# Patient Record
Sex: FEMALE | Race: WHITE | Employment: OTHER | URBAN - METROPOLITAN AREA
[De-identification: names, ages, dates, MRNs, and addresses within clinical notes are randomized per-mention and may not be internally consistent; named-entity substitution may affect disease eponyms.]

---

## 2017-03-29 RX ORDER — ATENOLOL 25 MG/1
TABLET ORAL
Qty: 30 TABLET | Refills: 2 | Status: SHIPPED | OUTPATIENT
Start: 2017-03-29 | End: 2017-05-19 | Stop reason: SDUPTHER

## 2017-05-01 RX ORDER — GABAPENTIN 100 MG/1
CAPSULE ORAL
Qty: 30 CAPSULE | Refills: 4 | Status: SHIPPED | OUTPATIENT
Start: 2017-05-01 | End: 2017-05-19 | Stop reason: SDUPTHER

## 2017-05-01 RX ORDER — GLYBURIDE 5 MG/1
TABLET ORAL
Qty: 30 TABLET | Refills: 4 | Status: SHIPPED | OUTPATIENT
Start: 2017-05-01 | End: 2017-05-19 | Stop reason: SDUPTHER

## 2017-05-19 ENCOUNTER — OFFICE VISIT (OUTPATIENT)
Dept: INTERNAL MEDICINE CLINIC | Age: 57
End: 2017-05-19

## 2017-05-19 VITALS
HEIGHT: 62 IN | SYSTOLIC BLOOD PRESSURE: 138 MMHG | WEIGHT: 194 LBS | HEART RATE: 64 BPM | BODY MASS INDEX: 35.7 KG/M2 | DIASTOLIC BLOOD PRESSURE: 84 MMHG

## 2017-05-19 DIAGNOSIS — I10 ESSENTIAL HYPERTENSION: ICD-10-CM

## 2017-05-19 DIAGNOSIS — G63 POLYNEUROPATHY ASSOCIATED WITH UNDERLYING DISEASE (HCC): ICD-10-CM

## 2017-05-19 DIAGNOSIS — E11.9 TYPE 2 DIABETES MELLITUS WITHOUT COMPLICATION, WITHOUT LONG-TERM CURRENT USE OF INSULIN (HCC): ICD-10-CM

## 2017-05-19 DIAGNOSIS — E11.9 TYPE 2 DIABETES MELLITUS WITHOUT COMPLICATION, WITHOUT LONG-TERM CURRENT USE OF INSULIN (HCC): Primary | ICD-10-CM

## 2017-05-19 PROCEDURE — 99212 OFFICE O/P EST SF 10 MIN: CPT | Performed by: INTERNAL MEDICINE

## 2017-05-19 RX ORDER — GABAPENTIN 100 MG/1
CAPSULE ORAL
Qty: 30 CAPSULE | Refills: 5 | Status: SHIPPED | OUTPATIENT
Start: 2017-05-19 | End: 2017-12-01 | Stop reason: SDUPTHER

## 2017-05-19 RX ORDER — ATENOLOL 25 MG/1
TABLET ORAL
Qty: 30 TABLET | Refills: 5 | Status: SHIPPED | OUTPATIENT
Start: 2017-05-19 | End: 2017-12-01 | Stop reason: SDUPTHER

## 2017-05-19 RX ORDER — GLYBURIDE 5 MG/1
TABLET ORAL
Qty: 30 TABLET | Refills: 5 | Status: SHIPPED | OUTPATIENT
Start: 2017-05-19 | End: 2017-12-01 | Stop reason: SDUPTHER

## 2017-05-19 ASSESSMENT — ENCOUNTER SYMPTOMS
BLOOD IN STOOL: 0
NAUSEA: 0
COUGH: 0
DIARRHEA: 0
CHEST TIGHTNESS: 0
SHORTNESS OF BREATH: 0
VOMITING: 0
WHEEZING: 0
ABDOMINAL PAIN: 0

## 2017-05-20 LAB
ANION GAP SERPL CALCULATED.3IONS-SCNC: 19 MMOL/L (ref 3–16)
BUN BLDV-MCNC: 4 MG/DL (ref 7–20)
CALCIUM SERPL-MCNC: 9.7 MG/DL (ref 8.3–10.6)
CHLORIDE BLD-SCNC: 83 MMOL/L (ref 99–110)
CO2: 22 MMOL/L (ref 21–32)
CREAT SERPL-MCNC: 0.5 MG/DL (ref 0.6–1.1)
CREATININE URINE: 20.9 MG/DL (ref 28–259)
ESTIMATED AVERAGE GLUCOSE: 162.8 MG/DL
GFR AFRICAN AMERICAN: >60
GFR NON-AFRICAN AMERICAN: >60
GLUCOSE BLD-MCNC: 249 MG/DL (ref 70–99)
HBA1C MFR BLD: 7.3 %
MICROALBUMIN UR-MCNC: <1.2 MG/DL
MICROALBUMIN/CREAT UR-RTO: ABNORMAL MG/G (ref 0–30)
POTASSIUM SERPL-SCNC: 4.6 MMOL/L (ref 3.5–5.1)
SODIUM BLD-SCNC: 124 MMOL/L (ref 136–145)

## 2017-05-22 DIAGNOSIS — E87.1 HYPONATREMIA: Primary | ICD-10-CM

## 2017-09-08 ENCOUNTER — TELEPHONE (OUTPATIENT)
Dept: INTERNAL MEDICINE CLINIC | Age: 57
End: 2017-09-08

## 2017-12-01 ENCOUNTER — OFFICE VISIT (OUTPATIENT)
Dept: INTERNAL MEDICINE CLINIC | Age: 57
End: 2017-12-01

## 2017-12-01 VITALS
HEIGHT: 62 IN | SYSTOLIC BLOOD PRESSURE: 130 MMHG | BODY MASS INDEX: 35.33 KG/M2 | DIASTOLIC BLOOD PRESSURE: 80 MMHG | HEART RATE: 64 BPM | WEIGHT: 192 LBS

## 2017-12-01 DIAGNOSIS — G63 POLYNEUROPATHY ASSOCIATED WITH UNDERLYING DISEASE (HCC): ICD-10-CM

## 2017-12-01 DIAGNOSIS — E11.9 TYPE 2 DIABETES MELLITUS WITHOUT COMPLICATION, WITHOUT LONG-TERM CURRENT USE OF INSULIN (HCC): Primary | ICD-10-CM

## 2017-12-01 DIAGNOSIS — I10 ESSENTIAL HYPERTENSION: ICD-10-CM

## 2017-12-01 PROCEDURE — 99212 OFFICE O/P EST SF 10 MIN: CPT | Performed by: INTERNAL MEDICINE

## 2017-12-01 RX ORDER — GLYBURIDE 5 MG/1
TABLET ORAL
Qty: 30 TABLET | Refills: 5 | Status: SHIPPED | OUTPATIENT
Start: 2017-12-01 | End: 2018-07-13 | Stop reason: SDUPTHER

## 2017-12-01 RX ORDER — OXYBUTYNIN CHLORIDE 5 MG/1
5 TABLET ORAL 3 TIMES DAILY
Qty: 90 TABLET | Refills: 5 | Status: SHIPPED | OUTPATIENT
Start: 2017-12-01 | End: 2018-07-13 | Stop reason: SDUPTHER

## 2017-12-01 RX ORDER — GABAPENTIN 100 MG/1
CAPSULE ORAL
Qty: 30 CAPSULE | Refills: 5 | Status: SHIPPED | OUTPATIENT
Start: 2017-12-01 | End: 2018-07-13 | Stop reason: SDUPTHER

## 2017-12-01 RX ORDER — ATENOLOL 25 MG/1
TABLET ORAL
Qty: 30 TABLET | Refills: 5 | Status: SHIPPED | OUTPATIENT
Start: 2017-12-01 | End: 2018-07-05 | Stop reason: SDUPTHER

## 2017-12-01 ASSESSMENT — ENCOUNTER SYMPTOMS
CHEST TIGHTNESS: 0
DIARRHEA: 0
VOMITING: 0
ABDOMINAL PAIN: 0
SHORTNESS OF BREATH: 0
WHEEZING: 0
COUGH: 0
BLOOD IN STOOL: 0
NAUSEA: 0

## 2017-12-01 NOTE — PROGRESS NOTES
Subjective:      Patient ID: Swati Newberry is a 61 y.o. female. HPI    She has HTN,DM,diabetic neuropathy. Blood pressure is under good control. Blood sugars are under fair control. Takes gabapentin for neuropathy. Review of Systems   Constitutional: Negative for fatigue, fever and unexpected weight change. Respiratory: Negative for cough, chest tightness, shortness of breath and wheezing. Cardiovascular: Negative for chest pain, palpitations and leg swelling. Gastrointestinal: Negative for abdominal pain, blood in stool, diarrhea, nausea and vomiting. Neurological: Negative for light-headedness. Objective:   Physical Exam   Constitutional: She is oriented to person, place, and time. She appears well-developed and well-nourished. HENT:   Head: Normocephalic and atraumatic. Eyes: Pupils are equal, round, and reactive to light. Neck: Normal range of motion. Neck supple. No thyromegaly present. Cardiovascular: Normal rate, regular rhythm and normal heart sounds. Exam reveals no gallop and no friction rub. No murmur heard. No carotid bruit   Pulmonary/Chest: Effort normal and breath sounds normal. No respiratory distress. She has no wheezes. She has no rales. She exhibits no tenderness. Abdominal: Soft. Bowel sounds are normal. She exhibits no distension and no mass. There is no tenderness. There is no rebound and no guarding. Musculoskeletal: She exhibits no edema. Neurological: She is alert and oriented to person, place, and time. Assessment:      1. Type 2 diabetes mellitus without complication, without long-term current use of insulin ([address]     2. Essential hypertension     3. Polyneuropathy associated with underlying disease ([address]             Plan:        HTN. Stable. Continue current meds for now     DM- stable. Continue current meds.     continue gabapentin. Add ditropan for urinary urge incontinence.     No blood test as she doesn't have insurance

## 2018-07-05 RX ORDER — ATENOLOL 25 MG/1
TABLET ORAL
Qty: 30 TABLET | Refills: 4 | Status: SHIPPED | OUTPATIENT
Start: 2018-07-05 | End: 2018-07-13 | Stop reason: SDUPTHER

## 2018-07-13 ENCOUNTER — OFFICE VISIT (OUTPATIENT)
Dept: INTERNAL MEDICINE CLINIC | Age: 58
End: 2018-07-13

## 2018-07-13 VITALS
BODY MASS INDEX: 36.25 KG/M2 | DIASTOLIC BLOOD PRESSURE: 80 MMHG | SYSTOLIC BLOOD PRESSURE: 138 MMHG | HEIGHT: 62 IN | WEIGHT: 197 LBS | HEART RATE: 72 BPM

## 2018-07-13 DIAGNOSIS — I10 ESSENTIAL HYPERTENSION: ICD-10-CM

## 2018-07-13 DIAGNOSIS — E11.9 TYPE 2 DIABETES MELLITUS WITHOUT COMPLICATION, WITHOUT LONG-TERM CURRENT USE OF INSULIN (HCC): ICD-10-CM

## 2018-07-13 DIAGNOSIS — N39.41 URINARY INCONTINENCE, URGE: ICD-10-CM

## 2018-07-13 DIAGNOSIS — G63 POLYNEUROPATHY ASSOCIATED WITH UNDERLYING DISEASE (HCC): ICD-10-CM

## 2018-07-13 DIAGNOSIS — E11.9 TYPE 2 DIABETES MELLITUS WITHOUT COMPLICATION, WITHOUT LONG-TERM CURRENT USE OF INSULIN (HCC): Primary | ICD-10-CM

## 2018-07-13 LAB
A/G RATIO: 1.3 (ref 1.1–2.2)
ALBUMIN SERPL-MCNC: 4.2 G/DL (ref 3.4–5)
ALP BLD-CCNC: 75 U/L (ref 40–129)
ALT SERPL-CCNC: 16 U/L (ref 10–40)
ANION GAP SERPL CALCULATED.3IONS-SCNC: 15 MMOL/L (ref 3–16)
AST SERPL-CCNC: 25 U/L (ref 15–37)
BASOPHILS ABSOLUTE: 0 K/UL (ref 0–0.2)
BASOPHILS RELATIVE PERCENT: 0.6 %
BILIRUB SERPL-MCNC: 0.7 MG/DL (ref 0–1)
BUN BLDV-MCNC: 6 MG/DL (ref 7–20)
CALCIUM SERPL-MCNC: 10 MG/DL (ref 8.3–10.6)
CHLORIDE BLD-SCNC: 95 MMOL/L (ref 99–110)
CHOLESTEROL, TOTAL: 165 MG/DL (ref 0–199)
CO2: 24 MMOL/L (ref 21–32)
CREAT SERPL-MCNC: 0.6 MG/DL (ref 0.6–1.1)
EOSINOPHILS ABSOLUTE: 0.1 K/UL (ref 0–0.6)
EOSINOPHILS RELATIVE PERCENT: 1 %
GFR AFRICAN AMERICAN: >60
GFR NON-AFRICAN AMERICAN: >60
GLOBULIN: 3.2 G/DL
GLUCOSE BLD-MCNC: 94 MG/DL (ref 70–99)
HCT VFR BLD CALC: 41.7 % (ref 36–48)
HDLC SERPL-MCNC: 72 MG/DL (ref 40–60)
HEMOGLOBIN: 14.5 G/DL (ref 12–16)
LDL CHOLESTEROL CALCULATED: 73 MG/DL
LYMPHOCYTES ABSOLUTE: 1.4 K/UL (ref 1–5.1)
LYMPHOCYTES RELATIVE PERCENT: 19.9 %
MCH RBC QN AUTO: 33.8 PG (ref 26–34)
MCHC RBC AUTO-ENTMCNC: 34.9 G/DL (ref 31–36)
MCV RBC AUTO: 97 FL (ref 80–100)
MONOCYTES ABSOLUTE: 0.5 K/UL (ref 0–1.3)
MONOCYTES RELATIVE PERCENT: 7.7 %
NEUTROPHILS ABSOLUTE: 5.1 K/UL (ref 1.7–7.7)
NEUTROPHILS RELATIVE PERCENT: 70.8 %
PDW BLD-RTO: 13.5 % (ref 12.4–15.4)
PLATELET # BLD: 138 K/UL (ref 135–450)
PMV BLD AUTO: 9.4 FL (ref 5–10.5)
POTASSIUM SERPL-SCNC: 4.6 MMOL/L (ref 3.5–5.1)
RBC # BLD: 4.3 M/UL (ref 4–5.2)
SODIUM BLD-SCNC: 134 MMOL/L (ref 136–145)
TOTAL PROTEIN: 7.4 G/DL (ref 6.4–8.2)
TRIGL SERPL-MCNC: 98 MG/DL (ref 0–150)
VLDLC SERPL CALC-MCNC: 20 MG/DL
WBC # BLD: 7.1 K/UL (ref 4–11)

## 2018-07-13 PROCEDURE — 99212 OFFICE O/P EST SF 10 MIN: CPT | Performed by: INTERNAL MEDICINE

## 2018-07-13 RX ORDER — GABAPENTIN 100 MG/1
CAPSULE ORAL
Qty: 30 CAPSULE | Refills: 5 | Status: SHIPPED | OUTPATIENT
Start: 2018-07-13 | End: 2019-01-25 | Stop reason: SDUPTHER

## 2018-07-13 RX ORDER — OXYBUTYNIN CHLORIDE 5 MG/1
5 TABLET ORAL 3 TIMES DAILY
Qty: 90 TABLET | Refills: 5 | Status: SHIPPED | OUTPATIENT
Start: 2018-07-13 | End: 2019-01-25 | Stop reason: SDUPTHER

## 2018-07-13 RX ORDER — ATENOLOL 25 MG/1
TABLET ORAL
Qty: 30 TABLET | Refills: 4 | Status: SHIPPED | OUTPATIENT
Start: 2018-07-13 | End: 2019-01-25 | Stop reason: SDUPTHER

## 2018-07-13 RX ORDER — GLYBURIDE 5 MG/1
TABLET ORAL
Qty: 30 TABLET | Refills: 5 | Status: SHIPPED | OUTPATIENT
Start: 2018-07-13 | End: 2019-02-28 | Stop reason: SDUPTHER

## 2018-07-13 ASSESSMENT — ENCOUNTER SYMPTOMS
NAUSEA: 0
COUGH: 0
ABDOMINAL PAIN: 0
CHEST TIGHTNESS: 0
SHORTNESS OF BREATH: 0
VOMITING: 0
WHEEZING: 0
DIARRHEA: 0
BLOOD IN STOOL: 0

## 2018-07-13 NOTE — PROGRESS NOTES
HTN.   Stable. Continue current meds for now     DM- stable.   Continue current meds.     continue gabapentin.     Continue ditropan for urinary urge incontinence.

## 2018-07-14 LAB
ESTIMATED AVERAGE GLUCOSE: 139.9 MG/DL
HBA1C MFR BLD: 6.5 %

## 2019-01-25 ENCOUNTER — OFFICE VISIT (OUTPATIENT)
Dept: INTERNAL MEDICINE CLINIC | Age: 59
End: 2019-01-25

## 2019-01-25 VITALS
WEIGHT: 203 LBS | HEIGHT: 62 IN | HEART RATE: 70 BPM | BODY MASS INDEX: 37.36 KG/M2 | DIASTOLIC BLOOD PRESSURE: 75 MMHG | SYSTOLIC BLOOD PRESSURE: 152 MMHG | RESPIRATION RATE: 18 BRPM

## 2019-01-25 DIAGNOSIS — N39.41 URINARY INCONTINENCE, URGE: ICD-10-CM

## 2019-01-25 DIAGNOSIS — E11.9 TYPE 2 DIABETES MELLITUS WITHOUT COMPLICATION, WITHOUT LONG-TERM CURRENT USE OF INSULIN (HCC): Primary | ICD-10-CM

## 2019-01-25 DIAGNOSIS — G63 POLYNEUROPATHY ASSOCIATED WITH UNDERLYING DISEASE (HCC): ICD-10-CM

## 2019-01-25 DIAGNOSIS — I10 ESSENTIAL HYPERTENSION: ICD-10-CM

## 2019-01-25 PROCEDURE — 99212 OFFICE O/P EST SF 10 MIN: CPT | Performed by: INTERNAL MEDICINE

## 2019-01-25 RX ORDER — AMLODIPINE BESYLATE 5 MG/1
5 TABLET ORAL DAILY
Qty: 30 TABLET | Refills: 5 | Status: SHIPPED | OUTPATIENT
Start: 2019-01-25 | End: 2019-07-05 | Stop reason: SDUPTHER

## 2019-01-25 RX ORDER — RANITIDINE 150 MG/1
150 TABLET ORAL 2 TIMES DAILY
Qty: 60 TABLET | Refills: 3 | Status: SHIPPED | OUTPATIENT
Start: 2019-01-25 | End: 2019-05-29 | Stop reason: SDUPTHER

## 2019-01-25 RX ORDER — GABAPENTIN 100 MG/1
100 CAPSULE ORAL 2 TIMES DAILY
Qty: 60 CAPSULE | Refills: 5 | Status: SHIPPED | OUTPATIENT
Start: 2019-01-25 | End: 2019-12-05 | Stop reason: SDUPTHER

## 2019-01-25 RX ORDER — OXYBUTYNIN CHLORIDE 5 MG/1
5 TABLET ORAL 3 TIMES DAILY
Qty: 90 TABLET | Refills: 5 | Status: SHIPPED | OUTPATIENT
Start: 2019-01-25 | End: 2019-07-05 | Stop reason: SDUPTHER

## 2019-01-25 RX ORDER — ATENOLOL 25 MG/1
TABLET ORAL
Qty: 30 TABLET | Refills: 5 | Status: SHIPPED | OUTPATIENT
Start: 2019-01-25 | End: 2019-07-05 | Stop reason: SDUPTHER

## 2019-01-28 ENCOUNTER — TELEPHONE (OUTPATIENT)
Dept: INTERNAL MEDICINE CLINIC | Age: 59
End: 2019-01-28

## 2019-02-28 RX ORDER — GLYBURIDE 5 MG/1
TABLET ORAL
Qty: 30 TABLET | Refills: 2 | Status: SHIPPED | OUTPATIENT
Start: 2019-02-28 | End: 2019-05-29 | Stop reason: SDUPTHER

## 2019-05-29 RX ORDER — GLYBURIDE 5 MG/1
TABLET ORAL
Qty: 30 TABLET | Refills: 2 | Status: SHIPPED | OUTPATIENT
Start: 2019-05-29 | End: 2019-06-28 | Stop reason: SDUPTHER

## 2019-05-29 RX ORDER — RANITIDINE 150 MG/1
TABLET ORAL
Qty: 60 TABLET | Refills: 2 | Status: SHIPPED | OUTPATIENT
Start: 2019-05-29 | End: 2019-06-28 | Stop reason: SDUPTHER

## 2019-06-28 RX ORDER — RANITIDINE 150 MG/1
TABLET ORAL
Qty: 60 TABLET | Refills: 2 | Status: SHIPPED | OUTPATIENT
Start: 2019-06-28 | End: 2019-12-05

## 2019-06-28 RX ORDER — GLYBURIDE 5 MG/1
TABLET ORAL
Qty: 30 TABLET | Refills: 2 | Status: SHIPPED | OUTPATIENT
Start: 2019-06-28 | End: 2019-10-25 | Stop reason: SDUPTHER

## 2019-07-05 ENCOUNTER — OFFICE VISIT (OUTPATIENT)
Dept: INTERNAL MEDICINE CLINIC | Age: 59
End: 2019-07-05

## 2019-07-05 ENCOUNTER — TELEPHONE (OUTPATIENT)
Dept: INTERNAL MEDICINE CLINIC | Age: 59
End: 2019-07-05

## 2019-07-05 VITALS
DIASTOLIC BLOOD PRESSURE: 84 MMHG | HEIGHT: 62 IN | BODY MASS INDEX: 35.15 KG/M2 | SYSTOLIC BLOOD PRESSURE: 138 MMHG | HEART RATE: 72 BPM | WEIGHT: 191 LBS

## 2019-07-05 DIAGNOSIS — G63 POLYNEUROPATHY ASSOCIATED WITH UNDERLYING DISEASE (HCC): ICD-10-CM

## 2019-07-05 DIAGNOSIS — I10 ESSENTIAL HYPERTENSION: ICD-10-CM

## 2019-07-05 DIAGNOSIS — F32.A DEPRESSION, UNSPECIFIED DEPRESSION TYPE: ICD-10-CM

## 2019-07-05 DIAGNOSIS — N39.41 URINARY INCONTINENCE, URGE: ICD-10-CM

## 2019-07-05 DIAGNOSIS — E11.9 TYPE 2 DIABETES MELLITUS WITHOUT COMPLICATION, WITHOUT LONG-TERM CURRENT USE OF INSULIN (HCC): Primary | ICD-10-CM

## 2019-07-05 DIAGNOSIS — E11.9 TYPE 2 DIABETES MELLITUS WITHOUT COMPLICATION, WITHOUT LONG-TERM CURRENT USE OF INSULIN (HCC): ICD-10-CM

## 2019-07-05 LAB
ANION GAP SERPL CALCULATED.3IONS-SCNC: 14 MMOL/L (ref 3–16)
BUN BLDV-MCNC: 3 MG/DL (ref 7–20)
CALCIUM SERPL-MCNC: 10 MG/DL (ref 8.3–10.6)
CHLORIDE BLD-SCNC: 101 MMOL/L (ref 99–110)
CO2: 20 MMOL/L (ref 21–32)
CREAT SERPL-MCNC: <0.5 MG/DL (ref 0.6–1.2)
CREATININE URINE: 33.5 MG/DL (ref 28–259)
GFR AFRICAN AMERICAN: >60
GFR NON-AFRICAN AMERICAN: >60
GLUCOSE BLD-MCNC: 85 MG/DL (ref 70–99)
MICROALBUMIN UR-MCNC: <1.2 MG/DL
MICROALBUMIN/CREAT UR-RTO: NORMAL MG/G (ref 0–30)
POTASSIUM SERPL-SCNC: 4.5 MMOL/L (ref 3.5–5.1)
SODIUM BLD-SCNC: 135 MMOL/L (ref 136–145)

## 2019-07-05 PROCEDURE — 99212 OFFICE O/P EST SF 10 MIN: CPT | Performed by: INTERNAL MEDICINE

## 2019-07-05 RX ORDER — OXYBUTYNIN CHLORIDE 5 MG/1
5 TABLET ORAL 3 TIMES DAILY
Qty: 90 TABLET | Refills: 5 | Status: SHIPPED | OUTPATIENT
Start: 2019-07-05 | End: 2020-03-13 | Stop reason: SDUPTHER

## 2019-07-05 RX ORDER — ESCITALOPRAM OXALATE 10 MG/1
10 TABLET ORAL DAILY
Qty: 30 TABLET | Refills: 5 | Status: SHIPPED | OUTPATIENT
Start: 2019-07-05 | End: 2020-03-13 | Stop reason: SDUPTHER

## 2019-07-05 RX ORDER — ATENOLOL 25 MG/1
TABLET ORAL
Qty: 30 TABLET | Refills: 5 | Status: SHIPPED | OUTPATIENT
Start: 2019-07-05 | End: 2020-03-13 | Stop reason: SDUPTHER

## 2019-07-05 RX ORDER — AMLODIPINE BESYLATE 5 MG/1
5 TABLET ORAL DAILY
Qty: 30 TABLET | Refills: 5 | Status: SHIPPED | OUTPATIENT
Start: 2019-07-05 | End: 2020-03-13 | Stop reason: SDUPTHER

## 2019-07-05 ASSESSMENT — ENCOUNTER SYMPTOMS
NAUSEA: 0
WHEEZING: 0
SHORTNESS OF BREATH: 0
COUGH: 0
CHEST TIGHTNESS: 0
ABDOMINAL PAIN: 0
VOMITING: 0
DIARRHEA: 0
BLOOD IN STOOL: 0

## 2019-07-05 NOTE — PROGRESS NOTES
Subjective:      Patient ID: Melanie Nelson is a 60 y.o. female. HPI  She has HTN,DM,diabetic neuropathy. Blood pressure is under good control. Blood sugars are under fair control. Takes gabapentin for neuropathy. Feels stressed and depressed. No suicidal ideation    Review of Systems   Constitutional: Negative for fatigue, fever and unexpected weight change. Respiratory: Negative for cough, chest tightness, shortness of breath and wheezing. Cardiovascular: Negative for chest pain, palpitations and leg swelling. Gastrointestinal: Negative for abdominal pain, blood in stool, diarrhea, nausea and vomiting. Neurological: Negative for light-headedness. Objective:   Physical Exam   Constitutional: She is oriented to person, place, and time. She appears well-developed and well-nourished. HENT:   Head: Normocephalic and atraumatic. Eyes: Pupils are equal, round, and reactive to light. Neck: Normal range of motion. Neck supple. No thyromegaly present. Cardiovascular: Normal rate, regular rhythm and normal heart sounds. Exam reveals no gallop and no friction rub. No murmur heard. No carotid bruit   Pulmonary/Chest: Effort normal and breath sounds normal. No respiratory distress. She has no wheezes. She has no rales. She exhibits no tenderness. Abdominal: Soft. Bowel sounds are normal. She exhibits no distension and no mass. There is no tenderness. There is no rebound and no guarding. Musculoskeletal: She exhibits no edema. Neurological: She is alert and oriented to person, place, and time. Assessment:       Diagnosis Orders   1. Type 2 diabetes mellitus without complication, without long-term current use of insulin ([facility])  Basic Metabolic Panel    Hemoglobin A1C     DIABETES FOOT EXAM    Microalbumin / Creatinine Urine Ratio   2. Essential hypertension     3. Polyneuropathy associated with underlying disease ([facility] [address]     4. Urinary incontinence, urge     5.  Depression, unspecified

## 2019-07-06 LAB
ESTIMATED AVERAGE GLUCOSE: 139.9 MG/DL
HBA1C MFR BLD: 6.5 %

## 2019-10-25 RX ORDER — GLYBURIDE 5 MG/1
TABLET ORAL
Qty: 30 TABLET | Refills: 5 | Status: SHIPPED | OUTPATIENT
Start: 2019-10-25 | End: 2020-03-13 | Stop reason: SDUPTHER

## 2019-12-05 RX ORDER — GABAPENTIN 100 MG/1
CAPSULE ORAL
Qty: 60 CAPSULE | Refills: 0 | Status: SHIPPED | OUTPATIENT
Start: 2019-12-05 | End: 2020-03-13 | Stop reason: SDUPTHER

## 2019-12-05 RX ORDER — RANITIDINE 150 MG/1
TABLET ORAL
Qty: 60 TABLET | Refills: 0 | Status: SHIPPED | OUTPATIENT
Start: 2019-12-05 | End: 2020-03-13 | Stop reason: SDUPTHER

## 2020-03-13 ENCOUNTER — OFFICE VISIT (OUTPATIENT)
Dept: INTERNAL MEDICINE CLINIC | Age: 60
End: 2020-03-13

## 2020-03-13 VITALS
HEART RATE: 68 BPM | HEIGHT: 62 IN | DIASTOLIC BLOOD PRESSURE: 76 MMHG | SYSTOLIC BLOOD PRESSURE: 122 MMHG | BODY MASS INDEX: 34.93 KG/M2

## 2020-03-13 PROCEDURE — 99212 OFFICE O/P EST SF 10 MIN: CPT | Performed by: INTERNAL MEDICINE

## 2020-03-13 RX ORDER — AMLODIPINE BESYLATE 5 MG/1
5 TABLET ORAL DAILY
Qty: 30 TABLET | Refills: 5 | Status: SHIPPED | OUTPATIENT
Start: 2020-03-13 | End: 2020-09-22 | Stop reason: SDUPTHER

## 2020-03-13 RX ORDER — GLYBURIDE 5 MG/1
TABLET ORAL
Qty: 30 TABLET | Refills: 5 | Status: SHIPPED | OUTPATIENT
Start: 2020-03-13 | End: 2020-09-22 | Stop reason: SDUPTHER

## 2020-03-13 RX ORDER — ATENOLOL 25 MG/1
TABLET ORAL
Qty: 30 TABLET | Refills: 5 | Status: SHIPPED | OUTPATIENT
Start: 2020-03-13 | End: 2020-09-22 | Stop reason: SDUPTHER

## 2020-03-13 RX ORDER — RANITIDINE 150 MG/1
TABLET ORAL
Qty: 60 TABLET | Refills: 5 | Status: SHIPPED | OUTPATIENT
Start: 2020-03-13 | End: 2021-03-31

## 2020-03-13 RX ORDER — OXYBUTYNIN CHLORIDE 5 MG/1
5 TABLET ORAL 3 TIMES DAILY
Qty: 90 TABLET | Refills: 5 | Status: SHIPPED | OUTPATIENT
Start: 2020-03-13 | End: 2020-09-22 | Stop reason: SDUPTHER

## 2020-03-13 RX ORDER — ESCITALOPRAM OXALATE 10 MG/1
10 TABLET ORAL DAILY
Qty: 30 TABLET | Refills: 5 | Status: SHIPPED | OUTPATIENT
Start: 2020-03-13 | End: 2020-09-22 | Stop reason: SDUPTHER

## 2020-03-13 ASSESSMENT — ENCOUNTER SYMPTOMS
CHEST TIGHTNESS: 0
VOMITING: 0
NAUSEA: 0
SHORTNESS OF BREATH: 0
ABDOMINAL PAIN: 0
BLOOD IN STOOL: 0
COUGH: 0
DIARRHEA: 0
WHEEZING: 0

## 2020-03-13 NOTE — PROGRESS NOTES
Subjective:      Patient ID: Liz Carranza is a 60 y.o. female. HPI  She has HTN,DM,diabetic neuropathy. Blood pressure is under good control. Blood sugars are under fair control. Takes gabapentin for neuropathy.     Ricardo Castillo is working well for depression    Had a distal left femoral fracture 2/20 s/p internal fixation  Now has a knee immobilizer     She does not have insurance    Review of Systems   Constitutional: Negative for fatigue, fever and unexpected weight change. Respiratory: Negative for cough, chest tightness, shortness of breath and wheezing. Cardiovascular: Negative for chest pain, palpitations and leg swelling. Gastrointestinal: Negative for abdominal pain, blood in stool, diarrhea, nausea and vomiting. Genitourinary: Negative for dysuria and hematuria. Neurological: Negative for light-headedness. Objective:   Physical Exam  Constitutional:       Appearance: She is well-developed. HENT:      Head: Normocephalic and atraumatic. Eyes:      Pupils: Pupils are equal, round, and reactive to light. Neck:      Musculoskeletal: Normal range of motion and neck supple. Thyroid: No thyromegaly. Cardiovascular:      Rate and Rhythm: Normal rate and regular rhythm. Heart sounds: Normal heart sounds. No murmur. No friction rub. No gallop. Comments: No carotid bruit  Pulmonary:      Effort: Pulmonary effort is normal. No respiratory distress. Breath sounds: Normal breath sounds. No wheezing or rales. Chest:      Chest wall: No tenderness. Abdominal:      General: Bowel sounds are normal. There is no distension. Palpations: Abdomen is soft. There is no mass. Tenderness: There is no abdominal tenderness. There is no guarding or rebound. Neurological:      Mental Status: She is alert and oriented to person, place, and time. Assessment:       Diagnosis Orders   1.  Type 2 diabetes mellitus without complication, without long-term current use of

## 2020-03-15 RX ORDER — GABAPENTIN 100 MG/1
CAPSULE ORAL
Qty: 60 CAPSULE | Refills: 0 | Status: SHIPPED | OUTPATIENT
Start: 2020-03-15 | End: 2020-04-17

## 2020-04-18 RX ORDER — GABAPENTIN 100 MG/1
CAPSULE ORAL
Qty: 60 CAPSULE | Refills: 0 | Status: SHIPPED | OUTPATIENT
Start: 2020-04-18 | End: 2020-04-20

## 2020-04-20 RX ORDER — GABAPENTIN 100 MG/1
CAPSULE ORAL
Qty: 60 CAPSULE | Refills: 0 | Status: SHIPPED | OUTPATIENT
Start: 2020-04-20 | End: 2020-05-18

## 2020-05-18 RX ORDER — GABAPENTIN 100 MG/1
CAPSULE ORAL
Qty: 60 CAPSULE | Refills: 0 | Status: SHIPPED | OUTPATIENT
Start: 2020-05-21 | End: 2020-06-10

## 2020-06-10 RX ORDER — GABAPENTIN 100 MG/1
CAPSULE ORAL
Qty: 60 CAPSULE | Refills: 0 | Status: SHIPPED | OUTPATIENT
Start: 2020-06-10 | End: 2020-08-28

## 2020-08-28 RX ORDER — GABAPENTIN 100 MG/1
CAPSULE ORAL
Qty: 60 CAPSULE | Refills: 0 | Status: SHIPPED | OUTPATIENT
Start: 2020-08-28 | End: 2020-09-24

## 2020-09-22 ENCOUNTER — OFFICE VISIT (OUTPATIENT)
Dept: INTERNAL MEDICINE CLINIC | Age: 60
End: 2020-09-22

## 2020-09-22 VITALS
BODY MASS INDEX: 37.17 KG/M2 | DIASTOLIC BLOOD PRESSURE: 82 MMHG | HEIGHT: 62 IN | HEART RATE: 60 BPM | SYSTOLIC BLOOD PRESSURE: 130 MMHG | WEIGHT: 202 LBS | TEMPERATURE: 97.7 F

## 2020-09-22 DIAGNOSIS — E11.9 TYPE 2 DIABETES MELLITUS WITHOUT COMPLICATION, WITHOUT LONG-TERM CURRENT USE OF INSULIN (HCC): ICD-10-CM

## 2020-09-22 DIAGNOSIS — I10 ESSENTIAL HYPERTENSION: ICD-10-CM

## 2020-09-22 LAB
A/G RATIO: 1.3 (ref 1.1–2.2)
ALBUMIN SERPL-MCNC: 4.2 G/DL (ref 3.4–5)
ALP BLD-CCNC: 97 U/L (ref 40–129)
ALT SERPL-CCNC: 18 U/L (ref 10–40)
ANION GAP SERPL CALCULATED.3IONS-SCNC: 10 MMOL/L (ref 3–16)
AST SERPL-CCNC: 33 U/L (ref 15–37)
BASOPHILS ABSOLUTE: 0 K/UL (ref 0–0.2)
BASOPHILS RELATIVE PERCENT: 0.9 %
BILIRUB SERPL-MCNC: 0.7 MG/DL (ref 0–1)
BUN BLDV-MCNC: 3 MG/DL (ref 7–20)
CALCIUM SERPL-MCNC: 10.1 MG/DL (ref 8.3–10.6)
CHLORIDE BLD-SCNC: 96 MMOL/L (ref 99–110)
CHOLESTEROL, TOTAL: 175 MG/DL (ref 0–199)
CO2: 27 MMOL/L (ref 21–32)
CREAT SERPL-MCNC: <0.5 MG/DL (ref 0.6–1.2)
CREATININE URINE: 17.5 MG/DL (ref 28–259)
EOSINOPHILS ABSOLUTE: 0.1 K/UL (ref 0–0.6)
EOSINOPHILS RELATIVE PERCENT: 2.5 %
GFR AFRICAN AMERICAN: >60
GFR NON-AFRICAN AMERICAN: >60
GLOBULIN: 3.2 G/DL
GLUCOSE BLD-MCNC: 136 MG/DL (ref 70–99)
HCT VFR BLD CALC: 40 % (ref 36–48)
HDLC SERPL-MCNC: 84 MG/DL (ref 40–60)
HEMOGLOBIN: 13.6 G/DL (ref 12–16)
LDL CHOLESTEROL CALCULATED: 75 MG/DL
LYMPHOCYTES ABSOLUTE: 1 K/UL (ref 1–5.1)
LYMPHOCYTES RELATIVE PERCENT: 29.3 %
MCH RBC QN AUTO: 30.9 PG (ref 26–34)
MCHC RBC AUTO-ENTMCNC: 33.9 G/DL (ref 31–36)
MCV RBC AUTO: 91 FL (ref 80–100)
MICROALBUMIN UR-MCNC: <1.2 MG/DL
MICROALBUMIN/CREAT UR-RTO: ABNORMAL MG/G (ref 0–30)
MONOCYTES ABSOLUTE: 0.4 K/UL (ref 0–1.3)
MONOCYTES RELATIVE PERCENT: 10.8 %
NEUTROPHILS ABSOLUTE: 1.9 K/UL (ref 1.7–7.7)
NEUTROPHILS RELATIVE PERCENT: 56.5 %
PDW BLD-RTO: 15.2 % (ref 12.4–15.4)
PLATELET # BLD: 128 K/UL (ref 135–450)
PMV BLD AUTO: 8.8 FL (ref 5–10.5)
POTASSIUM SERPL-SCNC: 4.6 MMOL/L (ref 3.5–5.1)
RBC # BLD: 4.4 M/UL (ref 4–5.2)
SODIUM BLD-SCNC: 133 MMOL/L (ref 136–145)
TOTAL PROTEIN: 7.4 G/DL (ref 6.4–8.2)
TRIGL SERPL-MCNC: 80 MG/DL (ref 0–150)
VLDLC SERPL CALC-MCNC: 16 MG/DL
WBC # BLD: 3.4 K/UL (ref 4–11)

## 2020-09-22 PROCEDURE — 99212 OFFICE O/P EST SF 10 MIN: CPT | Performed by: INTERNAL MEDICINE

## 2020-09-22 RX ORDER — RANITIDINE 150 MG/1
TABLET ORAL
Qty: 60 TABLET | Refills: 5 | Status: CANCELLED | OUTPATIENT
Start: 2020-09-22

## 2020-09-22 RX ORDER — ESCITALOPRAM OXALATE 10 MG/1
10 TABLET ORAL DAILY
Qty: 30 TABLET | Refills: 5 | Status: SHIPPED | OUTPATIENT
Start: 2020-09-22 | End: 2021-03-31 | Stop reason: SDUPTHER

## 2020-09-22 RX ORDER — GLYBURIDE 5 MG/1
TABLET ORAL
Qty: 30 TABLET | Refills: 5 | Status: SHIPPED | OUTPATIENT
Start: 2020-09-22 | End: 2021-03-31 | Stop reason: SDUPTHER

## 2020-09-22 RX ORDER — AMLODIPINE BESYLATE 5 MG/1
5 TABLET ORAL DAILY
Qty: 30 TABLET | Refills: 5 | Status: SHIPPED | OUTPATIENT
Start: 2020-09-22 | End: 2021-03-31 | Stop reason: SDUPTHER

## 2020-09-22 RX ORDER — ATENOLOL 25 MG/1
TABLET ORAL
Qty: 30 TABLET | Refills: 5 | Status: SHIPPED | OUTPATIENT
Start: 2020-09-22 | End: 2021-03-31 | Stop reason: SDUPTHER

## 2020-09-22 RX ORDER — FAMOTIDINE 20 MG/1
20 TABLET, FILM COATED ORAL 2 TIMES DAILY
Qty: 60 TABLET | Refills: 5 | Status: ON HOLD
Start: 2020-09-22 | End: 2021-03-27 | Stop reason: HOSPADM

## 2020-09-22 RX ORDER — BETAMETHASONE DIPROPIONATE 0.5 MG/G
CREAM TOPICAL
Qty: 60 G | Refills: 2 | Status: SHIPPED | OUTPATIENT
Start: 2020-09-22 | End: 2020-10-22

## 2020-09-22 RX ORDER — OXYBUTYNIN CHLORIDE 5 MG/1
5 TABLET ORAL 3 TIMES DAILY
Qty: 90 TABLET | Refills: 5 | Status: SHIPPED | OUTPATIENT
Start: 2020-09-22 | End: 2021-03-31 | Stop reason: SDUPTHER

## 2020-09-22 ASSESSMENT — ENCOUNTER SYMPTOMS
CHEST TIGHTNESS: 0
ABDOMINAL PAIN: 0
BLOOD IN STOOL: 0
WHEEZING: 0
DIARRHEA: 0
SHORTNESS OF BREATH: 0
VOMITING: 0
COUGH: 0
NAUSEA: 0

## 2020-09-22 NOTE — PROGRESS NOTES
Subjective:      Patient ID: Jared Tate is a 63 y.o. female. HPI  She has HTN,DM,diabetic neuropathy. Blood pressure is under good control. Blood sugars are under fair control. Takes gabapentin for neuropathy.     Katarzyna Mckay is working well for depression     Had a distal left femoral fracture 2/20 s/p internal fixation    Her psoriasis is flaring up      Review of Systems   Constitutional: Negative for fatigue, fever and unexpected weight change. Respiratory: Negative for cough, chest tightness, shortness of breath and wheezing. Cardiovascular: Negative for chest pain, palpitations and leg swelling. Gastrointestinal: Negative for abdominal pain, blood in stool, diarrhea, nausea and vomiting. Genitourinary: Negative for dysuria and hematuria. Neurological: Negative for light-headedness. Objective:   Physical Exam  Constitutional:       Appearance: She is well-developed. HENT:      Head: Normocephalic and atraumatic. Eyes:      Pupils: Pupils are equal, round, and reactive to light. Neck:      Musculoskeletal: Normal range of motion and neck supple. Thyroid: No thyromegaly. Cardiovascular:      Rate and Rhythm: Normal rate and regular rhythm. Heart sounds: Normal heart sounds. No murmur. No friction rub. No gallop. Comments: No carotid bruit  Pulmonary:      Effort: Pulmonary effort is normal. No respiratory distress. Breath sounds: Normal breath sounds. No wheezing or rales. Chest:      Chest wall: No tenderness. Abdominal:      General: Bowel sounds are normal. There is no distension. Palpations: Abdomen is soft. There is no mass. Tenderness: There is no abdominal tenderness. There is no guarding or rebound. Neurological:      Mental Status: She is alert and oriented to person, place, and time. Assessment:       Diagnosis Orders   1.  Type 2 diabetes mellitus without complication, without long-term current use of insulin (HCC)  Hemoglobin A1C Comprehensive Metabolic Panel    CBC Auto Differential    Lipid Panel    Microalbumin / Creatinine Urine Ratio     DIABETES FOOT EXAM   2. Essential hypertension  Comprehensive Metabolic Panel   3. Polyneuropathy associated with underlying disease ([facility] [address]     4. Depression, unspecified depression type     5. Urinary incontinence, urge             Plan:        HTN. Stable. Continue current meds      DM- stable. Continue current meds.     Peripheral neuropathy  continue gabapentin.     Depression  Stable.   Continue lexapro    psoriasis   Betamethasone cream     Continue ditropan for urinary urge incontinence.     Schedule mammogram   Hemoccult x3 done 7/19- negative        Nagi Brannon MD

## 2020-09-23 LAB
ESTIMATED AVERAGE GLUCOSE: 128.4 MG/DL
HBA1C MFR BLD: 6.1 %

## 2020-09-24 RX ORDER — GABAPENTIN 100 MG/1
CAPSULE ORAL
Qty: 60 CAPSULE | Refills: 0 | Status: SHIPPED | OUTPATIENT
Start: 2020-09-29 | End: 2020-11-02

## 2020-10-01 ENCOUNTER — TELEPHONE (OUTPATIENT)
Dept: INTERNAL MEDICINE CLINIC | Age: 60
End: 2020-10-01

## 2020-10-01 RX ORDER — BLOOD-GLUCOSE METER
KIT MISCELLANEOUS
Qty: 1 KIT | Refills: 0 | Status: SHIPPED | OUTPATIENT
Start: 2020-10-01

## 2020-10-01 NOTE — TELEPHONE ENCOUNTER
----- Message from Beulah Mcdonough MD sent at 9/23/2020  9:13 AM EDT -----  Blood tests are good except for her blood counts are borderline low    Repeat CBC with diff in 10 days   Dx LEukopenia

## 2020-11-02 RX ORDER — GABAPENTIN 100 MG/1
CAPSULE ORAL
Qty: 60 CAPSULE | Refills: 0 | Status: SHIPPED | OUTPATIENT
Start: 2020-11-02 | End: 2020-12-02

## 2020-12-02 RX ORDER — GABAPENTIN 100 MG/1
CAPSULE ORAL
Qty: 60 CAPSULE | Refills: 0 | Status: SHIPPED | OUTPATIENT
Start: 2020-12-04 | End: 2020-12-30

## 2020-12-30 RX ORDER — GABAPENTIN 100 MG/1
CAPSULE ORAL
Qty: 60 CAPSULE | Refills: 0 | Status: SHIPPED | OUTPATIENT
Start: 2021-01-04 | End: 2021-02-02

## 2021-02-02 DIAGNOSIS — G63 POLYNEUROPATHY ASSOCIATED WITH UNDERLYING DISEASE (HCC): ICD-10-CM

## 2021-02-02 RX ORDER — GABAPENTIN 100 MG/1
CAPSULE ORAL
Qty: 60 CAPSULE | Refills: 0 | Status: SHIPPED | OUTPATIENT
Start: 2021-02-03 | End: 2021-03-03

## 2021-03-03 DIAGNOSIS — G63 POLYNEUROPATHY ASSOCIATED WITH UNDERLYING DISEASE (HCC): ICD-10-CM

## 2021-03-03 RX ORDER — GABAPENTIN 100 MG/1
CAPSULE ORAL
Qty: 60 CAPSULE | Refills: 0 | Status: SHIPPED | OUTPATIENT
Start: 2021-03-05 | End: 2021-04-02

## 2021-03-21 ENCOUNTER — APPOINTMENT (OUTPATIENT)
Dept: GENERAL RADIOLOGY | Age: 61
DRG: 308 | End: 2021-03-21
Attending: HOSPITALIST
Payer: MEDICAID

## 2021-03-21 ENCOUNTER — HOSPITAL ENCOUNTER (EMERGENCY)
Age: 61
Discharge: ANOTHER HOSPITAL | End: 2021-03-21
Attending: STUDENT IN AN ORGANIZED HEALTH CARE EDUCATION/TRAINING PROGRAM
Payer: MEDICAID

## 2021-03-21 ENCOUNTER — APPOINTMENT (OUTPATIENT)
Dept: GENERAL RADIOLOGY | Age: 61
End: 2021-03-21
Payer: MEDICAID

## 2021-03-21 ENCOUNTER — HOSPITAL ENCOUNTER (INPATIENT)
Age: 61
LOS: 6 days | Discharge: HOME HEALTH | DRG: 308 | End: 2021-03-27
Attending: HOSPITALIST | Admitting: HOSPITALIST
Payer: MEDICAID

## 2021-03-21 VITALS
HEIGHT: 62 IN | OXYGEN SATURATION: 96 % | RESPIRATION RATE: 17 BRPM | BODY MASS INDEX: 36.99 KG/M2 | SYSTOLIC BLOOD PRESSURE: 115 MMHG | WEIGHT: 201 LBS | DIASTOLIC BLOOD PRESSURE: 69 MMHG | TEMPERATURE: 97.5 F | HEART RATE: 64 BPM

## 2021-03-21 DIAGNOSIS — S72.142A CLOSED DISPLACED INTERTROCHANTERIC FRACTURE OF LEFT FEMUR, INITIAL ENCOUNTER (HCC): Primary | ICD-10-CM

## 2021-03-21 DIAGNOSIS — S92.302A CLOSED DISPLACED FRACTURE OF METATARSAL BONE OF LEFT FOOT, UNSPECIFIED METATARSAL, INITIAL ENCOUNTER: ICD-10-CM

## 2021-03-21 DIAGNOSIS — E87.1 HYPONATREMIA: ICD-10-CM
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
## 2021-03-21 DIAGNOSIS — S52.502A CLOSED FRACTURE OF DISTAL END OF LEFT RADIUS, UNSPECIFIED FRACTURE MORPHOLOGY, INITIAL ENCOUNTER: ICD-10-CM

## 2021-03-21 DIAGNOSIS — S00.12XA BLACK EYE OF LEFT SIDE, INITIAL ENCOUNTER: ICD-10-CM

## 2021-03-21 DIAGNOSIS — S72.145A CLOSED NONDISPLACED INTERTROCHANTERIC FRACTURE OF LEFT FEMUR, INITIAL ENCOUNTER (HCC): Primary | ICD-10-CM

## 2021-03-21 PROBLEM — R63.1 PSYCHOGENIC POLYDIPSIA: Status: ACTIVE | Noted: 2021-03-21

## 2021-03-21 PROBLEM — S72.002A CLOSED FRACTURE OF NECK OF LEFT FEMUR (HCC): Status: ACTIVE | Noted: 2021-03-21

## 2021-03-21 PROBLEM — R73.9 ACUTE HYPERGLYCEMIA: Status: ACTIVE | Noted: 2021-03-21

## 2021-03-21 PROBLEM — D69.6 THROMBOCYTOPENIA (HCC): Status: ACTIVE | Noted: 2021-03-21

## 2021-03-21 PROBLEM — F54 PSYCHOGENIC POLYDIPSIA: Status: ACTIVE | Noted: 2021-03-21

## 2021-03-21 PROBLEM — R55 SYNCOPE AND COLLAPSE: Status: ACTIVE | Noted: 2021-03-21

## 2021-03-21 LAB
A/G RATIO: 1.2 (ref 1.1–2.2)
ALBUMIN SERPL-MCNC: 4.3 G/DL (ref 3.4–5)
ALP BLD-CCNC: 92 U/L (ref 40–129)
ALT SERPL-CCNC: 16 U/L (ref 10–40)
ANION GAP SERPL CALCULATED.3IONS-SCNC: 10 MMOL/L (ref 3–16)
AST SERPL-CCNC: 29 U/L (ref 15–37)
BASOPHILS ABSOLUTE: 0.1 K/UL (ref 0–0.2)
BASOPHILS RELATIVE PERCENT: 1 %
BILIRUB SERPL-MCNC: 1.5 MG/DL (ref 0–1)
BUN BLDV-MCNC: 6 MG/DL (ref 7–20)
CALCIUM SERPL-MCNC: 10.2 MG/DL (ref 8.3–10.6)
CHLORIDE BLD-SCNC: 87 MMOL/L (ref 99–110)
CO2: 28 MMOL/L (ref 21–32)
CREAT SERPL-MCNC: 0.6 MG/DL (ref 0.6–1.2)
EOSINOPHILS ABSOLUTE: 0 K/UL (ref 0–0.6)
EOSINOPHILS RELATIVE PERCENT: 0 %
GFR AFRICAN AMERICAN: >60
GFR NON-AFRICAN AMERICAN: >60
GLOBULIN: 3.5 G/DL
GLUCOSE BLD-MCNC: 179 MG/DL (ref 70–99)
GLUCOSE BLD-MCNC: 246 MG/DL (ref 70–99)
HCT VFR BLD CALC: 40.1 % (ref 36–48)
HEMOGLOBIN: 13.8 G/DL (ref 12–16)
INR BLD: 1.23 (ref 0.86–1.14)
LYMPHOCYTES ABSOLUTE: 1 K/UL (ref 1–5.1)
LYMPHOCYTES RELATIVE PERCENT: 16 %
MCH RBC QN AUTO: 33.1 PG (ref 26–34)
MCHC RBC AUTO-ENTMCNC: 34.3 G/DL (ref 31–36)
MCV RBC AUTO: 96.6 FL (ref 80–100)
MONOCYTES ABSOLUTE: 0.1 K/UL (ref 0–1.3)
MONOCYTES RELATIVE PERCENT: 2 %
NEUTROPHILS ABSOLUTE: 4.9 K/UL (ref 1.7–7.7)
NEUTROPHILS RELATIVE PERCENT: 81 %
PDW BLD-RTO: 14 % (ref 12.4–15.4)
PERFORMED ON: ABNORMAL
PLATELET # BLD: 106 K/UL (ref 135–450)
PLATELET SLIDE REVIEW: ADEQUATE
PMV BLD AUTO: 8.4 FL (ref 5–10.5)
POTASSIUM REFLEX MAGNESIUM: 3.9 MMOL/L (ref 3.5–5.1)
PROTHROMBIN TIME: 14.3 SEC (ref 10–13.2)
RBC # BLD: 4.16 M/UL (ref 4–5.2)
SLIDE REVIEW: ABNORMAL
SODIUM BLD-SCNC: 125 MMOL/L (ref 136–145)
TOTAL PROTEIN: 7.8 G/DL (ref 6.4–8.2)
WBC # BLD: 6.1 K/UL (ref 4–11)

## 2021-03-21 PROCEDURE — 80053 COMPREHEN METABOLIC PANEL: CPT

## 2021-03-21 PROCEDURE — 1200000000 HC SEMI PRIVATE

## 2021-03-21 PROCEDURE — 6360000002 HC RX W HCPCS: Performed by: NURSE PRACTITIONER

## 2021-03-21 PROCEDURE — 73110 X-RAY EXAM OF WRIST: CPT

## 2021-03-21 PROCEDURE — 96374 THER/PROPH/DIAG INJ IV PUSH: CPT

## 2021-03-21 PROCEDURE — 96375 TX/PRO/DX INJ NEW DRUG ADDON: CPT

## 2021-03-21 PROCEDURE — 73630 X-RAY EXAM OF FOOT: CPT

## 2021-03-21 PROCEDURE — 73552 X-RAY EXAM OF FEMUR 2/>: CPT

## 2021-03-21 PROCEDURE — 73502 X-RAY EXAM HIP UNI 2-3 VIEWS: CPT

## 2021-03-21 PROCEDURE — 99283 EMERGENCY DEPT VISIT LOW MDM: CPT | Performed by: ORTHOPAEDIC SURGERY

## 2021-03-21 PROCEDURE — 85610 PROTHROMBIN TIME: CPT

## 2021-03-21 PROCEDURE — 99283 EMERGENCY DEPT VISIT LOW MDM: CPT

## 2021-03-21 PROCEDURE — 6360000002 HC RX W HCPCS: Performed by: INTERNAL MEDICINE

## 2021-03-21 PROCEDURE — 71045 X-RAY EXAM CHEST 1 VIEW: CPT

## 2021-03-21 PROCEDURE — 6370000000 HC RX 637 (ALT 250 FOR IP): Performed by: NURSE PRACTITIONER

## 2021-03-21 PROCEDURE — 85025 COMPLETE CBC W/AUTO DIFF WBC: CPT

## 2021-03-21 RX ORDER — GABAPENTIN 100 MG/1
100 CAPSULE ORAL 2 TIMES DAILY
Status: DISCONTINUED | OUTPATIENT
Start: 2021-03-21 | End: 2021-03-27 | Stop reason: HOSPADM

## 2021-03-21 RX ORDER — DEXTROSE MONOHYDRATE 25 G/50ML
12.5 INJECTION, SOLUTION INTRAVENOUS PRN
Status: DISCONTINUED | OUTPATIENT
Start: 2021-03-21 | End: 2021-03-27 | Stop reason: HOSPADM

## 2021-03-21 RX ORDER — SODIUM CHLORIDE 0.9 % (FLUSH) 0.9 %
10 SYRINGE (ML) INJECTION PRN
Status: DISCONTINUED | OUTPATIENT
Start: 2021-03-21 | End: 2021-03-27 | Stop reason: HOSPADM

## 2021-03-21 RX ORDER — ESCITALOPRAM OXALATE 10 MG/1
10 TABLET ORAL DAILY
Status: DISCONTINUED | OUTPATIENT
Start: 2021-03-22 | End: 2021-03-27 | Stop reason: HOSPADM

## 2021-03-21 RX ORDER — POTASSIUM CHLORIDE 20 MEQ/1
40 TABLET, EXTENDED RELEASE ORAL PRN
Status: DISCONTINUED | OUTPATIENT
Start: 2021-03-21 | End: 2021-03-27 | Stop reason: HOSPADM

## 2021-03-21 RX ORDER — SODIUM CHLORIDE 9 MG/ML
INJECTION, SOLUTION INTRAVENOUS CONTINUOUS
Status: ACTIVE | OUTPATIENT
Start: 2021-03-21 | End: 2021-03-22

## 2021-03-21 RX ORDER — SODIUM CHLORIDE 0.9 % (FLUSH) 0.9 %
10 SYRINGE (ML) INJECTION EVERY 12 HOURS SCHEDULED
Status: DISCONTINUED | OUTPATIENT
Start: 2021-03-21 | End: 2021-03-27 | Stop reason: HOSPADM

## 2021-03-21 RX ORDER — DEXTROSE MONOHYDRATE 50 MG/ML
100 INJECTION, SOLUTION INTRAVENOUS PRN
Status: DISCONTINUED | OUTPATIENT
Start: 2021-03-21 | End: 2021-03-27 | Stop reason: HOSPADM

## 2021-03-21 RX ORDER — MORPHINE SULFATE 4 MG/ML
4 INJECTION, SOLUTION INTRAMUSCULAR; INTRAVENOUS ONCE
Status: COMPLETED | OUTPATIENT
Start: 2021-03-21 | End: 2021-03-21

## 2021-03-21 RX ORDER — POTASSIUM CHLORIDE 7.45 MG/ML
10 INJECTION INTRAVENOUS PRN
Status: DISCONTINUED | OUTPATIENT
Start: 2021-03-21 | End: 2021-03-27 | Stop reason: HOSPADM

## 2021-03-21 RX ORDER — INSULIN GLARGINE 100 [IU]/ML
14 INJECTION, SOLUTION SUBCUTANEOUS NIGHTLY
Status: DISCONTINUED | OUTPATIENT
Start: 2021-03-21 | End: 2021-03-23

## 2021-03-21 RX ORDER — MAGNESIUM SULFATE IN WATER 40 MG/ML
2000 INJECTION, SOLUTION INTRAVENOUS PRN
Status: DISCONTINUED | OUTPATIENT
Start: 2021-03-21 | End: 2021-03-27 | Stop reason: HOSPADM

## 2021-03-21 RX ORDER — ONDANSETRON 4 MG/1
4 TABLET, ORALLY DISINTEGRATING ORAL EVERY 8 HOURS PRN
Status: DISCONTINUED | OUTPATIENT
Start: 2021-03-21 | End: 2021-03-27 | Stop reason: HOSPADM

## 2021-03-21 RX ORDER — NICOTINE POLACRILEX 4 MG
15 LOZENGE BUCCAL PRN
Status: DISCONTINUED | OUTPATIENT
Start: 2021-03-21 | End: 2021-03-27 | Stop reason: HOSPADM

## 2021-03-21 RX ORDER — OXYCODONE HYDROCHLORIDE 5 MG/1
10 TABLET ORAL EVERY 4 HOURS PRN
Status: COMPLETED | OUTPATIENT
Start: 2021-03-21 | End: 2021-03-22

## 2021-03-21 RX ORDER — AMLODIPINE BESYLATE 5 MG/1
5 TABLET ORAL DAILY
Status: DISCONTINUED | OUTPATIENT
Start: 2021-03-22 | End: 2021-03-27 | Stop reason: HOSPADM

## 2021-03-21 RX ORDER — ONDANSETRON 2 MG/ML
4 INJECTION INTRAMUSCULAR; INTRAVENOUS EVERY 6 HOURS PRN
Status: DISCONTINUED | OUTPATIENT
Start: 2021-03-21 | End: 2021-03-27 | Stop reason: HOSPADM

## 2021-03-21 RX ORDER — OXYCODONE HYDROCHLORIDE 5 MG/1
5 TABLET ORAL EVERY 4 HOURS PRN
Status: COMPLETED | OUTPATIENT
Start: 2021-03-21 | End: 2021-03-23

## 2021-03-21 RX ORDER — ATENOLOL 25 MG/1
25 TABLET ORAL DAILY
Status: DISCONTINUED | OUTPATIENT
Start: 2021-03-22 | End: 2021-03-27 | Stop reason: HOSPADM

## 2021-03-21 RX ORDER — ONDANSETRON 2 MG/ML
4 INJECTION INTRAMUSCULAR; INTRAVENOUS ONCE
Status: COMPLETED | OUTPATIENT
Start: 2021-03-21 | End: 2021-03-21

## 2021-03-21 RX ORDER — ACETAMINOPHEN 325 MG/1
650 TABLET ORAL EVERY 6 HOURS PRN
Status: DISCONTINUED | OUTPATIENT
Start: 2021-03-21 | End: 2021-03-27 | Stop reason: HOSPADM

## 2021-03-21 RX ORDER — SENNA PLUS 8.6 MG/1
1 TABLET ORAL DAILY PRN
Status: DISCONTINUED | OUTPATIENT
Start: 2021-03-21 | End: 2021-03-27 | Stop reason: HOSPADM

## 2021-03-21 RX ORDER — FAMOTIDINE 20 MG/1
20 TABLET, FILM COATED ORAL 2 TIMES DAILY
Status: DISCONTINUED | OUTPATIENT
Start: 2021-03-21 | End: 2021-03-27 | Stop reason: HOSPADM

## 2021-03-21 RX ORDER — ACETAMINOPHEN 650 MG/1
650 SUPPOSITORY RECTAL EVERY 6 HOURS PRN
Status: DISCONTINUED | OUTPATIENT
Start: 2021-03-21 | End: 2021-03-27 | Stop reason: HOSPADM

## 2021-03-21 RX ORDER — HYDROCODONE BITARTRATE AND ACETAMINOPHEN 5; 325 MG/1; MG/1
1 TABLET ORAL ONCE
Status: COMPLETED | OUTPATIENT
Start: 2021-03-21 | End: 2021-03-21

## 2021-03-21 RX ADMIN — HYDROMORPHONE HYDROCHLORIDE 0.5 MG: 1 INJECTION, SOLUTION INTRAMUSCULAR; INTRAVENOUS; SUBCUTANEOUS at 21:24

## 2021-03-21 RX ADMIN — ONDANSETRON HYDROCHLORIDE 4 MG: 2 INJECTION, SOLUTION INTRAMUSCULAR; INTRAVENOUS at 15:31

## 2021-03-21 RX ADMIN — MORPHINE SULFATE 4 MG: 4 INJECTION, SOLUTION INTRAMUSCULAR; INTRAVENOUS at 15:31

## 2021-03-21 RX ADMIN — HYDROCODONE BITARTRATE AND ACETAMINOPHEN 1 TABLET: 5; 325 TABLET ORAL at 13:44

## 2021-03-21 ASSESSMENT — ENCOUNTER SYMPTOMS
COLOR CHANGE: 0
RHINORRHEA: 0
ABDOMINAL PAIN: 0
SHORTNESS OF BREATH: 0
SORE THROAT: 0

## 2021-03-21 ASSESSMENT — PAIN DESCRIPTION - ORIENTATION: ORIENTATION: LEFT

## 2021-03-21 ASSESSMENT — PAIN SCALES - GENERAL
PAINLEVEL_OUTOF10: 7
PAINLEVEL_OUTOF10: 7
PAINLEVEL_OUTOF10: 10
PAINLEVEL_OUTOF10: 7
PAINLEVEL_OUTOF10: 10

## 2021-03-21 ASSESSMENT — PAIN DESCRIPTION - PAIN TYPE
TYPE: ACUTE PAIN
TYPE: ACUTE PAIN

## 2021-03-21 ASSESSMENT — PAIN SCALES - WONG BAKER: WONGBAKER_NUMERICALRESPONSE: 4

## 2021-03-21 NOTE — ED PROVIDER NOTES
I independently performed a history and physical on Belenda Hammans. All diagnostic, treatment, and disposition decisions were made by myself in conjunction with the advanced practice provider. For further details of CELESTINE SANDHU[facility]CORINNA RAMOS OF [facility] emergency department encounter, please see the advance practice provider's documentation. In brief, this is a 57y.o. year old female, with   Chief Complaint   Patient presents with    Fall     pt sustained fall Friday evening, seen at [facility], Sat am felt \"loud pop\" in L leg hardware (post-sx for femur rfx 1 year ago), cannot walk since     Patient apparently fell 2 days ago and was initially seen at [facility].  She apparently had CT imaging along with x-rays. She was found to have a broken wrist.  Patient apparently went home and felt a loud popping sensation to the right hip area. Since then the patient has been unable to bear weight. On examination the patient has bruising about her left eye. Sclera and conjunctive are normal.  Neck is supple. Chest is clear to auscultation bilaterally regular rate rhythm. Abdomen is soft, overweight, nontender, nondistended, normal bowel sounds. Patient has significant pain with movement of the right lower extremity. Normal sensation distally. Unfortunately patient was found to have an intertrochanteric hip fracture. Orthopedics has requested transfer to [facility] as our facility currently does not have an operative bed capable of handling hip fractures secondary to malfunction with the existing one. Patient is agreeable with this plan. Patient was also found to be hyponatremic. She is splinted from the previously identified fractures.          Jahaira Jackson MD  03/22/21 1154

## 2021-03-21 NOTE — ED PROVIDER NOTES
[address] ED  EMERGENCY DEPARTMENT ENCOUNTER        Pt Name: Belenda Hammans  MRN: [id number]  [place] 1960  Dateof evaluation: 3/21/2021  Provider: DANIEL Menard - CNP  PCP: Carlena Snellen, MD  ED Attending: No att. providers found    [address]       Chief Complaint   Patient presents with    Fall     pt sustained fall Friday evening, seen at [facility], Sat am felt \"loud pop\" in L leg hardware (post-sx for femur rfx 1 year ago), cannot walk since       HISTORY OF PRESENTILLNESS   (Location/Symptom, Timing/Onset, Context/Setting, Quality, Duration, Modifying Factors, Severity)  Note limiting factors. Belenda Hammans is a 57 y.o. female for left hip pain. Onset was 2 days. Context includes patient states that she fell 3 days ago and was seen at [facility].  She reports that she had a complete work-up of lots of x-rays and CAT scans done. She was diagnosed with a left wrist fracture. Patient reports that she has been having left hip pain since the fall. Patient reports that she was able to ambulate however yesterday got up and felt a pop in her left hip and has had increased pain since then. Patient states that she is unable to bear weight now. Patient denies any new recent injury. Alleviating factors include nothing. Aggravating factors include movement ambulation unable to bear weight. Pain is 10/10. Ibuprofen has been used for pain today. Nursing Notes were all reviewed and agreed with or any disagreements were addressed  in the HPI. REVIEW OF SYSTEMS    (2-9 systems for level 4, 10 or more for level 5)     Review of Systems   Constitutional: Negative for fever. HENT: Negative for congestion, rhinorrhea and sore throat. Respiratory: Negative for shortness of breath. Cardiovascular: Negative for chest pain. Gastrointestinal: Negative for abdominal pain.    Genitourinary: Negative for decreased urine volume and difficulty urinating. Musculoskeletal: Negative for arthralgias and myalgias. Left hip pain   Skin: Negative for color change and rash. Neurological: Negative for dizziness and light-headedness. Psychiatric/Behavioral: Negative for agitation. All other systems reviewed and are negative. Positives and Pertinent negatives as per HPI. Except as noted above in the ROS, all other systems were reviewed and negative. PAST MEDICAL HISTORY     Past Medical History:   Diagnosis Date    Hypertension     Type II or unspecified type diabetes mellitus without mention of complication, not stated as uncontrolled          SURGICAL HISTORY     History reviewed. No pertinent surgical history. CURRENT MEDICATIONS       Discharge Medication List as of 3/21/2021  4:37 PM      CONTINUE these medications which have NOT CHANGED    Details   gabapentin (NEURONTIN) 100 MG capsule TAKE ONE CAPSULE BY MOUTH TWICE A DAY, Disp-60 capsule, R-0Normal      blood glucose test strips (ASCENSIA AUTODISC VI;ONE TOUCH ULTRA TEST VI) strip Disp-100 each, R-3, NormalUSE TO TEST DAILY. DX;E11.9      Blood Glucose Monitoring Suppl (Therma FliteR BLOOD GLUCOSE KIT) KIT Disp-1 kit, R-0, NormalUSE TO TEST DAILY.   DX:E11.9      oxybutynin (DITROPAN) 5 MG tablet Take 1 tablet by mouth 3 times daily, Disp-90 tablet, R-5Normal      escitalopram (LEXAPRO) 10 MG tablet Take 1 tablet by mouth daily, Disp-30 tablet, R-5Normal      metFORMIN (GLUCOPHAGE) 1000 MG tablet TAKE ONE TABLET BY MOUTH TWICE A DAY, Disp-60 tablet, R-5Normal      glyBURIDE (DIABETA) 5 MG tablet TAKE ONE TABLET BY MOUTH DAILY, Disp-30 tablet, R-5Normal      amLODIPine (NORVASC) 5 MG tablet Take 1 tablet by mouth daily, Disp-30 tablet, R-5Normal      atenolol (TENORMIN) 25 MG tablet TAKE ONE TABLET BY MOUTH DAILY, Disp-30 tablet, R-5Normal      famotidine (PEPCID) 20 MG tablet Take 1 tablet by mouth 2 times daily, Disp-60 tablet, R-5Normal      raNITIdine (ZANTAC) 150 MG tablet TAKE ONE TABLET BY MOUTH TWICE A DAY, Disp-60 tablet, R-5Normal               ALLERGIES     Ace inhibitors    FAMILY HISTORY     History reviewed. No pertinent family history. SOCIAL HISTORY       Social History     Socioeconomic History    Marital status:      Spouse name: None    Number of children: None    Years of education: None    Highest education level: None   Occupational History    None   Social Needs    Financial resource strain: None    Food insecurity     Worry: None     Inability: None    Transportation needs     Medical: None     Non-medical: None   Tobacco Use    Smoking status: Never Smoker    Smokeless tobacco: Never Used   Substance and Sexual Activity    Alcohol use: No    Drug use: None    Sexual activity: None   Lifestyle    Physical activity     Days per week: None     Minutes per session: None    Stress: None   Relationships    Social connections     Talks on phone: None     Gets together: None     Attends Synagogue service: None     Active member of club or organization: None     Attends meetings of clubs or organizations: None     Relationship status: None    Intimate partner violence     Fear of current or ex partner: None     Emotionally abused: None     Physically abused: None     Forced sexual activity: None   Other Topics Concern    None   Social History Narrative    None       SCREENINGS             PHYSICAL EXAM  (up to 7 for level 4, 8 or more for level 5)     ED Triage Vitals [03/21/21 1319]   BP Temp Temp Source Pulse Resp SpO2 Height Weight   (!) 159/87 97.5 °F (36.4 °C) Oral 64 16 99 % 5' 2\" (1.575 m) 201 lb (91.2 kg)       Physical Exam  Constitutional:       Appearance: She is well-developed. HENT:      Head: Normocephalic and atraumatic. Neck:      Musculoskeletal: Normal range of motion. Cardiovascular:      Rate and Rhythm: Normal rate. Pulmonary:      Effort: Pulmonary effort is normal. No respiratory distress.    Abdominal: General: There is no distension. Palpations: Abdomen is soft. Tenderness: There is no abdominal tenderness. Musculoskeletal:         General: Tenderness and signs of injury present. No swelling or deformity. Comments: Decreased range of motion to left hip due to pain elicited with movement. Tenderness with palpation the left hip. Sensation and pulse intact left foot. No tenderness with palpation to the lumbar and thoracic spinal and paraspinal processes. Patient denies radiculopathy or caudal anesthesia. Skin:     General: Skin is warm and dry. Neurological:      Mental Status: She is alert and oriented to person, place, and time. DIAGNOSTIC RESULTS   LABS:    Labs Reviewed   CBC WITH AUTO DIFFERENTIAL - Abnormal; Notable for the following components:       Result Value    Platelets 592 (*)     All other components within normal limits    Narrative:     Performed at:  [facility] [address],  [place], [facility]   Phone [phone]   COMPREHENSIVE METABOLIC PANEL W/ REFLEX TO MG FOR LOW K - Abnormal; Notable for the following components:    Sodium 125 (*)     Chloride 87 (*)     Glucose 179 (*)     BUN 6 (*)     Total Bilirubin 1.5 (*)     All other components within normal limits    Narrative:     Performed at:  [facility]) - [facility] [address],  [place], [facility]   Phone [phone] - Abnormal; Notable for the following components:    Protime 14.3 (*)     INR 1.23 (*)     All other components within normal limits    Narrative:     Performed at:  [facility]) - [facility] [address],  [place], [facility]   Phone [phone]       All other labs werewithin normal range or not returned as of this dictation. EKG:  All EKG's are interpreted by the Emergency Department Physician who either signs or Co-signs this chart in the absence of a cardiologist.  Please see their note for interpretation of EKG. RADIOLOGY:   Left wrist x-ray interpreted by radiologist for    FINDINGS:   Evaluation is somewhat limited by overlying casting material.  Comminuted   fracture of the distal radius with intra-articular extension.  Palmar   angulation of fracture fragments.  Soft tissue swelling.  Mild cystic change   throughout the carpus.  Mild-to-moderate degenerative changes of the 1st [facility]   joint. Left foot x-ray interpreted by radiologist for  Impression:    Suspected acute or subacute nondisplaced fractures of the 2nd through 4th   metatarsal heads and necks as well as the head of the 2nd proximal phalanx. Left hip x-ray interpreted by radiologist for  Impression:    Mildly displaced intertrochanteric fracture of the left hip. No acute osseous abnormality of the remainder of the left femur.  Previous   ORIF of distal femoral injury. Left femur x-ray interpreted by radiologist for  Impression:    Mildly displaced intertrochanteric fracture of the left hip. No acute osseous abnormality of the remainder of the left femur.  Previous   ORIF of distal femoral injury. Interpretation per the Radiologist below, if available at the time of this note:    XR WRIST LEFT (MIN 3 VIEWS)   Final Result   Distal radial fracture. XR FOOT LEFT (MIN 3 VIEWS)   Final Result   Suspected acute or subacute nondisplaced fractures of the 2nd through 4th   metatarsal heads and necks as well as the head of the 2nd proximal phalanx. XR HIP LEFT (2-3 VIEWS)   Final Result   Mildly displaced intertrochanteric fracture of the left hip. No acute osseous abnormality of the remainder of the left femur. Previous   ORIF of distal femoral injury. XR FEMUR LEFT (MIN 2 VIEWS)   Final Result   Mildly displaced intertrochanteric fracture of the left hip. No acute osseous abnormality of the remainder of the left femur.   Previous   ORIF of distal femoral injury. No results found. PROCEDURES   Unless otherwise noted below, none     Procedures     CRITICAL CARE TIME   N/A    CONSULTS:  IP CONSULT TO HOSPITALIST  IP CONSULT TO ORTHOPEDIC SURGERY      EMERGENCYDEPARTMENT COURSE and DIFFERENTIAL DIAGNOSIS/MDM:   Vitals:    Vitals:    03/21/21 1511 03/21/21 1515 03/21/21 1551 03/21/21 1607   BP:  127/77  115/69   Pulse: 63 65 64 64   Resp: 14 20 16 17   Temp:       TempSrc:       SpO2: 98% 97% 97% 96%   Weight:       Height:           Patient was given the following medications:  Medications   HYDROcodone-acetaminophen (NORCO) 5-325 MG per tablet 1 tablet (1 tablet Oral Given 3/21/21 1344)   morphine sulfate (PF) injection 4 mg (4 mg Intravenous Given 3/21/21 1531)   ondansetron (ZOFRAN) injection 4 mg (4 mg Intravenous Given 3/21/21 1531)       Patient was seen and evaluated by myself and Dr. Arielle Quesada. Patient here for concerns for left hip pain. Patient states that she fell 2 days ago. She reports that she was seen at an outside facility and diagnosed with a left wrist fracture. Patient reports that she had a head CT and additional x-rays done that revealed no other injuries. Patient states that she was discharged home 2 days ago and yesterday got up and felt a pop in her left hip. Patient states that she has not been able to bear weight since then and she has been having pain. On exam patient is awake and alert she is hemodynamically stable. She does have a left black eye. She does have a splint in place to her left forearm. Patient is neurologically intact her left foot however she is unable to bear weight on her left leg due to pain in her left hip. X-rays of her left hip were obtained and were concerning for a intratrochanteric left hip fracture. Orthopedic physician was consulted and was actually in the ED and evaluated the patient in the ED. He added additional x-rays including a forearm and foot.   Foot x-ray revealed metatarsal fractures as well. Patient already is splinted for her left wrist fracture. Consult was placed to the hospitalist at [facility] for transfer due to lack of an orthopedic operating room table at [facility]. Hospitalist is accepted. Patient's care was transferred to [facility] at this time. Patient was provided with pain medications in the ED. Basic labs were completed for preparation for preop and patient was found to be hyponatremic at 125. Patient does report that she has a history of hyponatremia. Patient reports that she likes to drink lots of water and in past has had to have her water restricted due to her sodium. The patient tolerated their visit well. I have evaluated this patient. My supervising physician was available for consultation. The patient and / or the family were informed of the results of any tests, a time was given to answer questions, a plan was proposed and they agreed with plan. FINAL IMPRESSION      1. Closed nondisplaced intertrochanteric fracture of left femur, initial encounter ([facility] [address]    2. Closed displaced fracture of metatarsal bone of left foot, unspecified metatarsal, initial encounter    3. Closed fracture of distal end of left radius, unspecified fracture morphology, initial encounter    4. Fall, subsequent encounter    5. Black eye of left side, initial encounter    6. Hyponatremia          DISPOSITION/PLAN   DISPOSITION Decision To Transfer 03/21/2021 04:37:44 PM      PATIENT REFERRED TO:  No follow-up provider specified.     DISCHARGE MEDICATIONS:  Discharge Medication List as of 3/21/2021  4:37 PM          DISCONTINUED MEDICATIONS:  Discharge Medication List as of 3/21/2021  4:37 PM                 (Please note that portions of this note were completed with a voice recognition program.  Efforts were made to edit the dictations but occasionally words are mis-transcribed.)    DANIEL Colon Cera - CNP (electronically signed)         Sergio Saez Emiliano Serrano - [facility]  03/21/21 [address] - [facility]  03/22/21 1013

## 2021-03-21 NOTE — ED NOTES
1415- Call placed to transfer center for [facility]     Call was returned by Dr. Jensen Morning and spoke to Michela Mckenna  03/21/21 3073

## 2021-03-21 NOTE — PROGRESS NOTES
[facility] Orthopedic Surgery  Progress Note              Chase Lott 57 y.o. admitted for a fall with left hip and wrist fraxure. She fell on Friday, seen in [facility], xray left wrist and femur and not the hip. Today she felt a pop and sharp pain left hip in her bathroom. IMAGING: X-rays were taken 3/21/2021, 3 views of the leftan hip and AP pelvis also 3 views left wrist, was reviewed and showed intertrochanteric displaced fracture with old femur plate, and also displaced left distal radius fracture. IMPRESSION: Left femur intertrochanteric fracture and left distal radius fracture. PLAN:   I discussed the overall alignment of the 2 fractures with the patient over the phone, and treatment options including both surgical and non-surgical treatment, and that my recommendation would be an open reduction and internal fixation of both fractures with Gamma nail left femur and ORIF left distal radius given the amount of displacement of the fracture. I discussed the risks and benefits of surgery with the patient, including but not limited to infection, bleeding, pain, injury to nerves or blood vessels failure of the surgery and need for additional surgery. All the patient's questions were answered. We discussed an expected post-operative course. She is understanding of this and wishes to proceed. Surgery tomorrow afternoon at [facility] if medically stable.        Arabella Germain MD 3/21/2021 4:19 PM

## 2021-03-21 NOTE — ED NOTES
Bed: T2  Expected date:   Expected time:   Means of arrival:   Comments:     Sendy Pereira RN  03/21/21 9634

## 2021-03-21 NOTE — PROGRESS NOTES
4 Eyes Skin Assessment     The patient is being assess for   Admission    I agree that 2 RN's have performed a thorough Head to Toe Skin Assessment on the patient. ALL assessment sites listed below have been assessed. Areas assessed by both nurses:   [x]   Head, Face, and Ears   [x]   Shoulders, Back, and Chest, Abdomen  [x]   Arms, Elbows, and Hands   [x]   Coccyx, Sacrum, and Ischium  [x]   Legs, Feet, and Heels        Scattered bruising and abrasions. Coccyx red/blanches. Sacral heart applied. Co-signer eSignature: Electronically signed by Yasmin Guzman RN on 3/21/21 at 6:15 PM EDT    Does the Patient have Skin Breakdown?   No          Edgar Prevention initiated:  NA   Wound Care Orders initiated:  NA      WOC nurse consulted for Pressure Injury (Stage 3,4, Unstageable, DTI, NWPT, Complex wounds)and New or Established Ostomies:  NA      Primary Nurse eSignature: Electronically signed by Perico Rock RN on 3/21/21 at 6:08 PM EDT

## 2021-03-21 NOTE — H&P
HOSPITALISTS HISTORY AND PHYSICAL    3/21/2021 11:15 PM    Patient Information:  Petar De Santiago is a 57 y.o. female [id number]  PCP:  Tony Modi MD (Tel: [phone] )    Chief complaint:   Left-sided hip pain status post syncope and collapse    History of Present Illness:  Calvin Yen is a 57 y.o. female who presented to [facility] ED to be evaluated for left-sided hip, wrist, and foot pain s/p syncope and collapse 3 days PTA. She reports that prior to her syncopal event she had noted some dizziness but denies chest pain, shortness of breath, or diaphoresis. The patient has a history of recurrent PE in the past and has been told that it is secondary to hypoglycemia and/or hyponatremia. The patient admits that she drinks approximately 4+ liters of water daily despite recommendations for fluid restriction by physicians. She was initially evaluated [facility] ED at which time she was diagnosed with left wrist fracture but told that she had no evidence of hip fracture. Patient endorsed sudden pop following initial evaluation with subsequent inability to bear weight. She was then reevaluated at [facility] ED at which time she does have minimally displaced intertrochanteric fracture of her left hip. Labs were obtained and notable for moderate hyponatremia at 125, thrombocytopenia, and acute hyperglycemia due to uncontrolled diabetes. She was transferred to [facility] for anticipated surgical intervention by orthopedics team.    History obtained from patient and review of Epic chart       REVIEW OF SYSTEMS:   Constitutional: Negative for fever,chills,weight loss, and generalized weakness  ENT: Negative for rhinorrhea, epistaxis, hoarseness, and sore throat.   Respiratory: Negative for shortness of breath, wheezing, and cough  Cardiovascular: Negative for chest pain, palpitations, and peripheral edema; no orthopnea or PND  Gastrointestinal: Negative for N/V/D; no hematemesis, hematochezia, or melena; no anorexia  Genitourinary: Negative for dysuria, frequency, hesitancy, and urgency; no incontinence  Hematologic/Lymphatic: Negative for bleeding tendency, excessive bruising, and enlarged LN  Musculoskeletal: Pain status post fall on wrist, hip, and foot  Neurologic: Positive for LOC and dizziness; no seizure activity  Skin: Positive bruising status post fall  Psychiatric: Negative for depression,anxiety, and agitation; denies SI/HI  Endocrine: Positive polydipsia    Past Medical History:   has a past medical history of Hypertension and Type II or unspecified type diabetes mellitus without mention of complication, not stated as uncontrolled. Past Surgical History:   has no past surgical history on file. Medications:  No current facility-administered medications on file prior to encounter. Current Outpatient Medications on File Prior to Encounter   Medication Sig Dispense Refill    gabapentin (NEURONTIN) 100 MG capsule TAKE ONE CAPSULE BY MOUTH TWICE A DAY 60 capsule 0    blood glucose test strips (ASCENSIA AUTODISC VI;ONE TOUCH ULTRA TEST VI) strip USE TO TEST DAILY. DX;E11.9 100 each 3    Blood Glucose Monitoring Suppl ([employer] BLOOD GLUCOSE KIT) KIT USE TO TEST DAILY.   DX:E11.9 1 kit 0    oxybutynin (DITROPAN) 5 MG tablet Take 1 tablet by mouth 3 times daily 90 tablet 5    escitalopram (LEXAPRO) 10 MG tablet Take 1 tablet by mouth daily 30 tablet 5    metFORMIN (GLUCOPHAGE) 1000 MG tablet TAKE ONE TABLET BY MOUTH TWICE A DAY 60 tablet 5    glyBURIDE (DIABETA) 5 MG tablet TAKE ONE TABLET BY MOUTH DAILY 30 tablet 5    amLODIPine (NORVASC) 5 MG tablet Take 1 tablet by mouth daily 30 tablet 5    atenolol (TENORMIN) 25 MG tablet TAKE ONE TABLET BY MOUTH DAILY 30 tablet 5    famotidine (PEPCID) 20 MG tablet Take 1 tablet by mouth 2 times daily 60 tablet 5    raNITIdine (ZANTAC) 150 MG tablet TAKE ONE TABLET BY MOUTH TWICE A DAY 60 tablet 5       Allergies: Allergies   Allergen Reactions    Ace Inhibitors         Social History:   reports that she has never smoked. She has never used smokeless tobacco. She reports that she does not drink alcohol. Family History:  family history is not on file.      Physical Exam:  /75   Pulse 74   Temp 98.9 °F (37.2 °C) (Oral)   Resp 16   SpO2 95%     General appearance: WDWN female with left-sided periorbital contusion comfortably in bed  Eyes: Sclera clear without conjunctival injection; PERRLA; EOMI  ENT: Mucous membranes moist without thrush; normal dentition  Neck: Supple without meningismus; no goiter; no carotid bruit bilaterally  Cardiovascular: Regular rhythm without ectopy; normal S1-S2 with no murmurs; no peripheral edema; no JVD  Respiratory: No tachypnea; CTAB with adequate air exchange, no wheeze, rhonchi or rales; I:E intact  Gastrointestinal: Abdomen soft, non-tender, not distended; bowel sounds normal x4 quadrants; no masses/organomegaly appreciated  Musculoskeletal: Decreased ROM left hip secondary to pain; left metatarsal and third toe ecchymosis; LUE splint in place  Neurology: A&O x3; cranial nerves 2-12 grossly intact; motor 5/5  BUE/BLE;; no seizure activity  Psychiatry: Well-groomed with good eye contact; appropriate affect; no visual/auditory hallucination  Skin: Warm, dry, normal turgor, no rash  PV: 2/4 radial and dorsalis pedis bilaterally; brisk capillary refill    Labs:  CBC:   Lab Results   Component Value Date    WBC 6.1 03/21/2021    RBC 4.16 03/21/2021    HGB 13.8 03/21/2021    HCT 40.1 03/21/2021    MCV 96.6 03/21/2021    MCH 33.1 03/21/2021    MCHC 34.3 03/21/2021    RDW 14.0 03/21/2021     03/21/2021    MPV 8.4 03/21/2021     BMP:    Lab Results   Component Value Date     03/21/2021    K 3.9 03/21/2021    CL 87 03/21/2021    CO2 28 03/21/2021    BUN 6 03/21/2021    CREATININE 0.6 03/21/2021    CALCIUM 10.2 03/21/2021    GFRAA >60 03/21/2021    LABGLOM >60 03/21/2021    LABGLOM 94 08/24/2015    GLUCOSE 179 03/21/2021     XR CHEST PORTABLE   Final Result   Clear lungs. Cardiomegaly. No acute cardiopulmonary abnormality.                    XR WRIST LEFT (MIN 3 VIEWS)   Final Result   Distal radial fracture.           XR FOOT LEFT (MIN 3 VIEWS)   Final Result   Suspected acute or subacute nondisplaced fractures of the 2nd through 4th   metatarsal heads and necks as well as the head of the 2nd proximal phalanx.           XR HIP LEFT (2-3 VIEWS)   Final Result   Mildly displaced intertrochanteric fracture of the left hip.       No acute osseous abnormality of the remainder of the left femur. Previous   ORIF of distal femoral injury.           XR FEMUR LEFT (MIN 2 VIEWS)   Final Result   Mildly displaced intertrochanteric fracture of the left hip.       No acute osseous abnormality of the remainder of the left femur. Previous   ORIF of distal femoral injury. EKG: Pending at time of dictation    I visualized CXR images and EKG strips personally and agree with documented interpretation    Discussed case  with ED provider    Problem List  Principal Problem:    Closed fracture of neck of left femur (HCC)  Active Problems:    Closed fracture of distal end of left radius    Essential hypertension    Syncope and collapse    Hyponatremia    Psychogenic polydipsia    Acute hyperglycemia    DM (diabetes mellitus), type 2, uncontrolled (HCC)    Peripheral neuropathy    Thrombocytopenia ([address]  Resolved Problems:    * No resolved hospital problems.  *        Assessment/Plan:     Left femoral neck fracture/left distal radius fracture  Patient admitted to orthopedic unit and placed on stress overnight  Hare catheter in place  Preoperative labs, EKG, CXR ordered and reviewed for preoperative history and physical  Tylenol and Neurontin provided for nonnarcotic pain relief options  OxyIR to be used sparingly for severe discomfort  Patient to be n.p.o. after midnight  Orthopedic team consulted with plans for a.m. surgical intervention    Syncope and collapse  Recurrent issue for this patient which she attributes to hypoglycemia on previous occasions  Patient also with longstanding history of hyponatremia which likely contributing to dizziness potentiating event  EKG obtained with results pending at the time of dictation  Fall precautions in place  Recommend follow-up echo to rule out valvular dysfunction    Hyponatremia secondary to psychogenic polydipsia  Patient admitted to telemetry floor with fall and seizure precautions in place  Serial neuro checks scheduled Q4H overnight  Strict I's and O's   Normal saline bolus administered in ED with gentle maintenance IVF to infuse overnight  Serial renal panels to ensure adequate electrolyte correction (while avoiding overly aggressive correction with subsequent CPM)  Serum/urine osmolality and spot urine sodium to determine FENa   Medications with potential to worsen hyponatremia held including: diuretics, NSAIDS, PPI  NEPHRO consult placed for further recommendations    Uncontrolled type II DM with acute hyperglycemia  A1c ordered with results pending at time of dictation  Home hypoglycemic medications placed on temporary hold  Weight-based basal insulin initiated QHS  Humalog medium dose SSI scheduled before meals and at bedtime as needed for POC glucose >1160  Carbohydrate restriction placed on diet  Consultation placed to Nutrition for ADA dietary education    Periorbital hematoma status post head contusion/trauma  Patient reports head CT performed at initial evaluation and noted to be negative for acute abnormality  Recommend checks every 4 hours overnight  Fall parameters in place      DVT prophylaxisBLE SCD rather than chemical anticoagulation due to thrombocytopenia and upcoming surgical intervention  Code statusfull code

## 2021-03-21 NOTE — CONSULTS
[id number] [facility] Orthopedic Consult Note        Consult reason/question:  L hip fracture    Reason for admission:  L hip fracture    HPI:  The patient is a 57 y.o. female who presents with a left hip fracture. She reports a mechanical fall from standing. She was seen at an outside hospital and diagnosed with a wrist fracture, which was splinted. She subsequently had an additional minor trauma and felt a pop in her left hip. She was not able to ambulate after that. She takes oral medications for DM. Baseline Functional Status:  Ambulates without assistive devices; she has a L distal femur fracture ~1 year ago. Past Medical History:        Diagnosis Date    Hypertension     Type II or unspecified type diabetes mellitus without mention of complication, not stated as uncontrolled        Past Surgical History:    History reviewed. No pertinent surgical history. Allergies:  Ace inhibitors    Current Medications:   Current Facility-Administered Medications: morphine sulfate (PF) injection 4 mg, 4 mg, Intravenous, Once  ondansetron (ZOFRAN) injection 4 mg, 4 mg, Intravenous, Once    Social History:   TOBACCO:   reports that she has never smoked. She has never used smokeless tobacco.  ETOH:   reports no history of alcohol use. DRUGS:   has no history on file for drug. Review of Systems:  A 14 point review of systems was conducted and was negative other than that discussed in the history of present illness. Physical Examination:  The patient is resting comfortably in the hospital room, in no acute distress. The patient demonstrates intact coordination with hand shaking. The patient is alert and oriented to person, place, and time. Mood and affect are normal.  She is alone. Normal respiratory effort. Pulse is regular in rate and rhythm.     RUE:  Volar wrist splint in place    LLE:  Leg is shortened, externally rotated  Well-healed surgical scars about lateral thigh  Ecchymosis about toes    Labs/Data Review:  CBC: Lab Results   Component Value Date    WBC 6.1 03/21/2021    RBC 4.16 03/21/2021    HGB 13.8 03/21/2021    HCT 40.1 03/21/2021    MCV 96.6 03/21/2021    MCH 33.1 03/21/2021    MCHC 34.3 03/21/2021    RDW 14.0 03/21/2021     03/21/2021    MPV 8.4 03/21/2021     Radiology Review:    Xr Hip Left (2-3 Views)    Result Date: 3/21/2021  EXAMINATION: TWO XRAY VIEWS OF THE LEFT HIP; 2 XRAY VIEWS OF THE LEFT FEMUR 3/21/2021 1:44 pm COMPARISON: None. HISTORY: ORDERING SYSTEM PROVIDED HISTORY: hip pain TECHNOLOGIST PROVIDED HISTORY: Reason for exam:->hip pain Reason for Exam: injury after fall Acuity: Acute Type of Exam: Initial; ORDERING SYSTEM PROVIDED HISTORY: fall pain TECHNOLOGIST PROVIDED HISTORY: Reason for exam:->fall pain Reason for Exam: heard leg pop after fall Acuity: Acute Type of Exam: Initial FINDINGS: Left hip, two views: Intertrochanteric fracture of the left hip with mild overriding of the fracture fragments. No other fracture of the visualized pelvis or left hip. The hip joints bilaterally are unremarkable. The SI joints are intact. Left femur, two views: No other fracture or dislocation of the left femur. Suspected posttraumatic changes in the region of the distal femoral metaphysis with lateral surgical plate and screws in place. 1 of the distal screws is no longer engaged with the overlying surgical plate. Periarticular osteopenia at the knee. No focal soft tissue abnormality. Mildly displaced intertrochanteric fracture of the left hip. No acute osseous abnormality of the remainder of the left femur. Previous ORIF of distal femoral injury. Xr Femur Left (min 2 Views)    Result Date: 3/21/2021  EXAMINATION: TWO XRAY VIEWS OF THE LEFT HIP; 2 XRAY VIEWS OF THE LEFT FEMUR 3/21/2021 1:44 pm COMPARISON: None.  HISTORY: ORDERING SYSTEM PROVIDED HISTORY: hip pain TECHNOLOGIST PROVIDED HISTORY: Reason for exam:->hip pain Reason for Exam: injury after fall Acuity: Acute Type of Exam: Initial; ORDERING SYSTEM PROVIDED HISTORY: fall pain TECHNOLOGIST PROVIDED HISTORY: Reason for exam:->fall pain Reason for Exam: heard leg pop after fall Acuity: Acute Type of Exam: Initial FINDINGS: Left hip, two views: Intertrochanteric fracture of the left hip with mild overriding of the fracture fragments. No other fracture of the visualized pelvis or left hip. The hip joints bilaterally are unremarkable. The SI joints are intact. Left femur, two views: No other fracture or dislocation of the left femur. Suspected posttraumatic changes in the region of the distal femoral metaphysis with lateral surgical plate and screws in place. 1 of the distal screws is no longer engaged with the overlying surgical plate. Periarticular osteopenia at the knee. No focal soft tissue abnormality. Mildly displaced intertrochanteric fracture of the left hip. No acute osseous abnormality of the remainder of the left femur. Previous ORIF of distal femoral injury. Radiology my review:  L intertrochanteric hip fracture in the setting of lateral distal femur locking plate for fracture 1 year ago    Assessment:  57 F with L intertrochanteric hip fracture. This will require surgery. I recommended admission to  and transfer to [facility] for proper OR table.     Plan:   L hip fixation, likely 22 March   Appreciate  working on pre-operative clearance/optimization   L wrist and L foot films to r/o fracture   Patient may eat now, NPO p MN    Mobile #:  Aron Reyes MD   3/21/2021  3:17 PM

## 2021-03-21 NOTE — ED NOTES
REPORT GIVEN TO JEFFERSON VALLE. EMTALA FORM SIGNED AND ON PT'S CHART.       Lc Hannah, RN  03/21/21 0566

## 2021-03-22 ENCOUNTER — APPOINTMENT (OUTPATIENT)
Dept: GENERAL RADIOLOGY | Age: 61
DRG: 308 | End: 2021-03-22
Attending: HOSPITALIST
Payer: MEDICAID

## 2021-03-22 ENCOUNTER — ANESTHESIA (OUTPATIENT)
Dept: OPERATING ROOM | Age: 61
DRG: 308 | End: 2021-03-22
Payer: MEDICAID

## 2021-03-22 ENCOUNTER — ANESTHESIA EVENT (OUTPATIENT)
Dept: OPERATING ROOM | Age: 61
DRG: 308 | End: 2021-03-22
Payer: MEDICAID

## 2021-03-22 VITALS
DIASTOLIC BLOOD PRESSURE: 59 MMHG | RESPIRATION RATE: 3 BRPM | OXYGEN SATURATION: 97 % | SYSTOLIC BLOOD PRESSURE: 84 MMHG | TEMPERATURE: 98.6 F

## 2021-03-22 LAB
A/G RATIO: 1.1 (ref 1.1–2.2)
ALBUMIN SERPL-MCNC: 3.9 G/DL (ref 3.4–5)
ALP BLD-CCNC: 78 U/L (ref 40–129)
ALT SERPL-CCNC: 12 U/L (ref 10–40)
ANION GAP SERPL CALCULATED.3IONS-SCNC: 1 MMOL/L (ref 3–16)
AST SERPL-CCNC: 24 U/L (ref 15–37)
BASOPHILS ABSOLUTE: 0 K/UL (ref 0–0.2)
BASOPHILS RELATIVE PERCENT: 0.6 %
BILIRUB SERPL-MCNC: 1.4 MG/DL (ref 0–1)
BUN BLDV-MCNC: 8 MG/DL (ref 7–20)
CALCIUM SERPL-MCNC: 10.1 MG/DL (ref 8.3–10.6)
CHLORIDE BLD-SCNC: 91 MMOL/L (ref 99–110)
CHLORIDE URINE RANDOM: <20 MMOL/L
CO2: 38 MMOL/L (ref 21–32)
CREAT SERPL-MCNC: <0.5 MG/DL (ref 0.6–1.2)
CREATININE URINE: 205.4 MG/DL (ref 28–259)
EKG ATRIAL RATE: 76 BPM
EKG DIAGNOSIS: NORMAL
EKG P AXIS: 48 DEGREES
EKG P-R INTERVAL: 146 MS
EKG Q-T INTERVAL: 420 MS
EKG QRS DURATION: 76 MS
EKG QTC CALCULATION (BAZETT): 472 MS
EKG R AXIS: 19 DEGREES
EKG T AXIS: 42 DEGREES
EKG VENTRICULAR RATE: 76 BPM
EOSINOPHILS ABSOLUTE: 0.2 K/UL (ref 0–0.6)
EOSINOPHILS RELATIVE PERCENT: 3.3 %
ESTIMATED AVERAGE GLUCOSE: 111.2 MG/DL
GFR AFRICAN AMERICAN: >60
GFR NON-AFRICAN AMERICAN: >60
GLOBULIN: 3.6 G/DL
GLUCOSE BLD-MCNC: 139 MG/DL (ref 70–99)
GLUCOSE BLD-MCNC: 167 MG/DL (ref 70–99)
GLUCOSE BLD-MCNC: 185 MG/DL (ref 70–99)
GLUCOSE BLD-MCNC: 216 MG/DL (ref 70–99)
GLUCOSE BLD-MCNC: 252 MG/DL (ref 70–99)
HBA1C MFR BLD: 5.5 %
HCT VFR BLD CALC: 36.3 % (ref 36–48)
HEMOGLOBIN: 12.3 G/DL (ref 12–16)
LYMPHOCYTES ABSOLUTE: 1.4 K/UL (ref 1–5.1)
LYMPHOCYTES RELATIVE PERCENT: 24.6 %
MCH RBC QN AUTO: 32.7 PG (ref 26–34)
MCHC RBC AUTO-ENTMCNC: 33.8 G/DL (ref 31–36)
MCV RBC AUTO: 96.6 FL (ref 80–100)
MONOCYTES ABSOLUTE: 0.7 K/UL (ref 0–1.3)
MONOCYTES RELATIVE PERCENT: 11.6 %
NEUTROPHILS ABSOLUTE: 3.5 K/UL (ref 1.7–7.7)
NEUTROPHILS RELATIVE PERCENT: 59.9 %
OSMOLALITY URINE: 476 MOSM/KG (ref 390–1070)
OSMOLALITY: 277 MOSM/KG (ref 280–301)
PDW BLD-RTO: 14 % (ref 12.4–15.4)
PERFORMED ON: ABNORMAL
PLATELET # BLD: 108 K/UL (ref 135–450)
PMV BLD AUTO: 9.1 FL (ref 5–10.5)
POTASSIUM REFLEX MAGNESIUM: 3.8 MMOL/L (ref 3.5–5.1)
POTASSIUM, UR: 27.4 MMOL/L
RBC # BLD: 3.76 M/UL (ref 4–5.2)
REASON FOR REJECTION: NORMAL
REJECTED TEST: NORMAL
SARS-COV-2, NAAT: NOT DETECTED
SODIUM BLD-SCNC: 130 MMOL/L (ref 136–145)
SODIUM URINE: <20 MMOL/L
TOTAL PROTEIN: 7.5 G/DL (ref 6.4–8.2)
URIC ACID, SERUM: 4.9 MG/DL (ref 2.6–6)
WBC # BLD: 5.8 K/UL (ref 4–11)

## 2021-03-22 PROCEDURE — 2580000003 HC RX 258: Performed by: ORTHOPAEDIC SURGERY

## 2021-03-22 PROCEDURE — 25609 OPTX DST RD XART FX/EP SEP3+: CPT | Performed by: ORTHOPAEDIC SURGERY

## 2021-03-22 PROCEDURE — C1713 ANCHOR/SCREW BN/BN,TIS/BN: HCPCS | Performed by: ORTHOPAEDIC SURGERY

## 2021-03-22 PROCEDURE — 0QS906Z REPOSITION LEFT FEMORAL SHAFT WITH INTRAMEDULLARY INTERNAL FIXATION DEVICE, OPEN APPROACH: ICD-10-PCS | Performed by: ORTHOPAEDIC SURGERY

## 2021-03-22 PROCEDURE — 20680 REMOVAL OF IMPLANT DEEP: CPT | Performed by: ORTHOPAEDIC SURGERY

## 2021-03-22 PROCEDURE — 2700000000 HC OXYGEN THERAPY PER DAY

## 2021-03-22 PROCEDURE — 3700000000 HC ANESTHESIA ATTENDED CARE: Performed by: ORTHOPAEDIC SURGERY

## 2021-03-22 PROCEDURE — 27245 TREAT THIGH FRACTURE: CPT | Performed by: ORTHOPAEDIC SURGERY

## 2021-03-22 PROCEDURE — 82570 ASSAY OF URINE CREATININE: CPT

## 2021-03-22 PROCEDURE — 3700000001 HC ADD 15 MINUTES (ANESTHESIA): Performed by: ORTHOPAEDIC SURGERY

## 2021-03-22 PROCEDURE — 0PSJ04Z REPOSITION LEFT RADIUS WITH INTERNAL FIXATION DEVICE, OPEN APPROACH: ICD-10-PCS | Performed by: ORTHOPAEDIC SURGERY

## 2021-03-22 PROCEDURE — 3600000014 HC SURGERY LEVEL 4 ADDTL 15MIN: Performed by: ORTHOPAEDIC SURGERY

## 2021-03-22 PROCEDURE — 83036 HEMOGLOBIN GLYCOSYLATED A1C: CPT

## 2021-03-22 PROCEDURE — 6370000000 HC RX 637 (ALT 250 FOR IP): Performed by: HOSPITALIST

## 2021-03-22 PROCEDURE — 7100000000 HC PACU RECOVERY - FIRST 15 MIN: Performed by: ORTHOPAEDIC SURGERY

## 2021-03-22 PROCEDURE — 6360000002 HC RX W HCPCS: Performed by: NURSE ANESTHETIST, CERTIFIED REGISTERED

## 2021-03-22 PROCEDURE — 1200000000 HC SEMI PRIVATE

## 2021-03-22 PROCEDURE — 2580000003 HC RX 258: Performed by: HOSPITALIST

## 2021-03-22 PROCEDURE — 6370000000 HC RX 637 (ALT 250 FOR IP): Performed by: ORTHOPAEDIC SURGERY

## 2021-03-22 PROCEDURE — 2709999900 HC NON-CHARGEABLE SUPPLY: Performed by: ORTHOPAEDIC SURGERY

## 2021-03-22 PROCEDURE — 83930 ASSAY OF BLOOD OSMOLALITY: CPT

## 2021-03-22 PROCEDURE — 84133 ASSAY OF URINE POTASSIUM: CPT

## 2021-03-22 PROCEDURE — 73552 X-RAY EXAM OF FEMUR 2/>: CPT

## 2021-03-22 PROCEDURE — 94761 N-INVAS EAR/PLS OXIMETRY MLT: CPT

## 2021-03-22 PROCEDURE — 6360000002 HC RX W HCPCS: Performed by: ANESTHESIOLOGY

## 2021-03-22 PROCEDURE — 93010 ELECTROCARDIOGRAM REPORT: CPT | Performed by: INTERNAL MEDICINE

## 2021-03-22 PROCEDURE — 2500000003 HC RX 250 WO HCPCS: Performed by: NURSE ANESTHETIST, CERTIFIED REGISTERED

## 2021-03-22 PROCEDURE — 6360000002 HC RX W HCPCS: Performed by: ORTHOPAEDIC SURGERY

## 2021-03-22 PROCEDURE — 7100000001 HC PACU RECOVERY - ADDTL 15 MIN: Performed by: ORTHOPAEDIC SURGERY

## 2021-03-22 PROCEDURE — 2500000003 HC RX 250 WO HCPCS: Performed by: ANESTHESIOLOGY

## 2021-03-22 PROCEDURE — 36415 COLL VENOUS BLD VENIPUNCTURE: CPT

## 2021-03-22 PROCEDURE — 80053 COMPREHEN METABOLIC PANEL: CPT

## 2021-03-22 PROCEDURE — 2720000010 HC SURG SUPPLY STERILE: Performed by: ORTHOPAEDIC SURGERY

## 2021-03-22 PROCEDURE — 93005 ELECTROCARDIOGRAM TRACING: CPT | Performed by: HOSPITALIST

## 2021-03-22 PROCEDURE — 83935 ASSAY OF URINE OSMOLALITY: CPT

## 2021-03-22 PROCEDURE — 2580000003 HC RX 258: Performed by: ANESTHESIOLOGY

## 2021-03-22 PROCEDURE — 0YPB0YZ REMOVAL OF OTHER DEVICE FROM LEFT LOWER EXTREMITY, OPEN APPROACH: ICD-10-PCS | Performed by: ORTHOPAEDIC SURGERY

## 2021-03-22 PROCEDURE — 84300 ASSAY OF URINE SODIUM: CPT

## 2021-03-22 PROCEDURE — 82436 ASSAY OF URINE CHLORIDE: CPT

## 2021-03-22 PROCEDURE — 87635 SARS-COV-2 COVID-19 AMP PRB: CPT

## 2021-03-22 PROCEDURE — C1769 GUIDE WIRE: HCPCS | Performed by: ORTHOPAEDIC SURGERY

## 2021-03-22 PROCEDURE — 85025 COMPLETE CBC W/AUTO DIFF WBC: CPT

## 2021-03-22 PROCEDURE — 6370000000 HC RX 637 (ALT 250 FOR IP): Performed by: INTERNAL MEDICINE

## 2021-03-22 PROCEDURE — 3600000004 HC SURGERY LEVEL 4 BASE: Performed by: ORTHOPAEDIC SURGERY

## 2021-03-22 PROCEDURE — 6360000002 HC RX W HCPCS: Performed by: PHYSICIAN ASSISTANT

## 2021-03-22 PROCEDURE — 84550 ASSAY OF BLOOD/URIC ACID: CPT

## 2021-03-22 DEVICE — LOCKING SCREW, FULLY THREADED,T8
Type: IMPLANTABLE DEVICE | Status: FUNCTIONAL
Brand: VARIAX

## 2021-03-22 DEVICE — BONE SCREW, FULLY THREADED, T8
Type: IMPLANTABLE DEVICE | Status: FUNCTIONAL
Brand: VARIAX

## 2021-03-22 DEVICE — TROCHANTERIC NAIL KIT, TI
Type: IMPLANTABLE DEVICE | Site: HIP | Status: FUNCTIONAL
Brand: GAMMA

## 2021-03-22 DEVICE — KIRSCHNER WIRE
Type: IMPLANTABLE DEVICE | Status: FUNCTIONAL
Brand: VARIAX

## 2021-03-22 DEVICE — VOLAR DR PLATE INTERM. LEFT EXTRASHORT
Type: IMPLANTABLE DEVICE | Status: FUNCTIONAL
Brand: VARIAX

## 2021-03-22 DEVICE — LAG SCREW, TI
Type: IMPLANTABLE DEVICE | Site: HIP | Status: FUNCTIONAL
Brand: GAMMA

## 2021-03-22 DEVICE — LOCKING SCREW
Type: IMPLANTABLE DEVICE | Site: HIP | Status: FUNCTIONAL
Brand: T2 ALPHA

## 2021-03-22 RX ORDER — ROCURONIUM BROMIDE 10 MG/ML
INJECTION, SOLUTION INTRAVENOUS PRN
Status: DISCONTINUED | OUTPATIENT
Start: 2021-03-22 | End: 2021-03-22 | Stop reason: SDUPTHER

## 2021-03-22 RX ORDER — DEXAMETHASONE SODIUM PHOSPHATE 10 MG/ML
INJECTION INTRAMUSCULAR; INTRAVENOUS PRN
Status: DISCONTINUED | OUTPATIENT
Start: 2021-03-22 | End: 2021-03-22 | Stop reason: SDUPTHER

## 2021-03-22 RX ORDER — MEPERIDINE HYDROCHLORIDE 50 MG/ML
12.5 INJECTION INTRAMUSCULAR; INTRAVENOUS; SUBCUTANEOUS EVERY 5 MIN PRN
Status: DISCONTINUED | OUTPATIENT
Start: 2021-03-22 | End: 2021-03-22 | Stop reason: HOSPADM

## 2021-03-22 RX ORDER — EPHEDRINE SULFATE 50 MG/ML
INJECTION INTRAVENOUS PRN
Status: DISCONTINUED | OUTPATIENT
Start: 2021-03-22 | End: 2021-03-22 | Stop reason: SDUPTHER

## 2021-03-22 RX ORDER — SUCCINYLCHOLINE CHLORIDE 20 MG/ML
INJECTION INTRAMUSCULAR; INTRAVENOUS PRN
Status: DISCONTINUED | OUTPATIENT
Start: 2021-03-22 | End: 2021-03-22 | Stop reason: SDUPTHER

## 2021-03-22 RX ORDER — PHENYLEPHRINE HCL IN 0.9% NACL 1 MG/10 ML
SYRINGE (ML) INTRAVENOUS PRN
Status: DISCONTINUED | OUTPATIENT
Start: 2021-03-22 | End: 2021-03-22 | Stop reason: SDUPTHER

## 2021-03-22 RX ORDER — OXYCODONE HYDROCHLORIDE AND ACETAMINOPHEN 5; 325 MG/1; MG/1
2 TABLET ORAL PRN
Status: DISCONTINUED | OUTPATIENT
Start: 2021-03-22 | End: 2021-03-22 | Stop reason: HOSPADM

## 2021-03-22 RX ORDER — MIDAZOLAM HYDROCHLORIDE 1 MG/ML
INJECTION INTRAMUSCULAR; INTRAVENOUS PRN
Status: DISCONTINUED | OUTPATIENT
Start: 2021-03-22 | End: 2021-03-22 | Stop reason: SDUPTHER

## 2021-03-22 RX ORDER — SODIUM CHLORIDE 0.9 % (FLUSH) 0.9 %
10 SYRINGE (ML) INJECTION EVERY 12 HOURS SCHEDULED
Status: DISCONTINUED | OUTPATIENT
Start: 2021-03-22 | End: 2021-03-27 | Stop reason: HOSPADM

## 2021-03-22 RX ORDER — MORPHINE SULFATE 2 MG/ML
2 INJECTION, SOLUTION INTRAMUSCULAR; INTRAVENOUS EVERY 4 HOURS PRN
Status: DISCONTINUED | OUTPATIENT
Start: 2021-03-22 | End: 2021-03-27 | Stop reason: HOSPADM

## 2021-03-22 RX ORDER — ONDANSETRON 2 MG/ML
4 INJECTION INTRAMUSCULAR; INTRAVENOUS
Status: DISCONTINUED | OUTPATIENT
Start: 2021-03-22 | End: 2021-03-22 | Stop reason: HOSPADM

## 2021-03-22 RX ORDER — SODIUM CHLORIDE 0.9 % (FLUSH) 0.9 %
10 SYRINGE (ML) INJECTION EVERY 12 HOURS SCHEDULED
Status: DISCONTINUED | OUTPATIENT
Start: 2021-03-22 | End: 2021-03-22 | Stop reason: HOSPADM

## 2021-03-22 RX ORDER — SODIUM CHLORIDE 450 MG/100ML
INJECTION, SOLUTION INTRAVENOUS CONTINUOUS
Status: DISCONTINUED | OUTPATIENT
Start: 2021-03-22 | End: 2021-03-23

## 2021-03-22 RX ORDER — HYDROMORPHONE HCL 110MG/55ML
PATIENT CONTROLLED ANALGESIA SYRINGE INTRAVENOUS PRN
Status: DISCONTINUED | OUTPATIENT
Start: 2021-03-22 | End: 2021-03-22 | Stop reason: SDUPTHER

## 2021-03-22 RX ORDER — SENNA AND DOCUSATE SODIUM 50; 8.6 MG/1; MG/1
1 TABLET, FILM COATED ORAL 2 TIMES DAILY
Status: DISCONTINUED | OUTPATIENT
Start: 2021-03-22 | End: 2021-03-27 | Stop reason: HOSPADM

## 2021-03-22 RX ORDER — PROPOFOL 10 MG/ML
INJECTION, EMULSION INTRAVENOUS PRN
Status: DISCONTINUED | OUTPATIENT
Start: 2021-03-22 | End: 2021-03-22 | Stop reason: SDUPTHER

## 2021-03-22 RX ORDER — FENTANYL CITRATE 50 UG/ML
INJECTION, SOLUTION INTRAMUSCULAR; INTRAVENOUS PRN
Status: DISCONTINUED | OUTPATIENT
Start: 2021-03-22 | End: 2021-03-22 | Stop reason: SDUPTHER

## 2021-03-22 RX ORDER — MORPHINE SULFATE 2 MG/ML
1 INJECTION, SOLUTION INTRAMUSCULAR; INTRAVENOUS EVERY 5 MIN PRN
Status: DISCONTINUED | OUTPATIENT
Start: 2021-03-22 | End: 2021-03-22 | Stop reason: HOSPADM

## 2021-03-22 RX ORDER — SODIUM CHLORIDE 9 MG/ML
INJECTION, SOLUTION INTRAVENOUS CONTINUOUS
Status: DISCONTINUED | OUTPATIENT
Start: 2021-03-22 | End: 2021-03-23

## 2021-03-22 RX ORDER — SODIUM CHLORIDE, SODIUM LACTATE, POTASSIUM CHLORIDE, CALCIUM CHLORIDE 600; 310; 30; 20 MG/100ML; MG/100ML; MG/100ML; MG/100ML
INJECTION, SOLUTION INTRAVENOUS CONTINUOUS
Status: DISCONTINUED | OUTPATIENT
Start: 2021-03-22 | End: 2021-03-22

## 2021-03-22 RX ORDER — SODIUM CHLORIDE 0.9 % (FLUSH) 0.9 %
10 SYRINGE (ML) INJECTION PRN
Status: DISCONTINUED | OUTPATIENT
Start: 2021-03-22 | End: 2021-03-22 | Stop reason: HOSPADM

## 2021-03-22 RX ORDER — OXYCODONE HYDROCHLORIDE AND ACETAMINOPHEN 5; 325 MG/1; MG/1
1 TABLET ORAL PRN
Status: DISCONTINUED | OUTPATIENT
Start: 2021-03-22 | End: 2021-03-22 | Stop reason: HOSPADM

## 2021-03-22 RX ORDER — ONDANSETRON 2 MG/ML
INJECTION INTRAMUSCULAR; INTRAVENOUS PRN
Status: DISCONTINUED | OUTPATIENT
Start: 2021-03-22 | End: 2021-03-22 | Stop reason: SDUPTHER

## 2021-03-22 RX ORDER — SODIUM CHLORIDE 0.9 % (FLUSH) 0.9 %
10 SYRINGE (ML) INJECTION PRN
Status: DISCONTINUED | OUTPATIENT
Start: 2021-03-22 | End: 2021-03-27 | Stop reason: HOSPADM

## 2021-03-22 RX ORDER — MORPHINE SULFATE 2 MG/ML
2 INJECTION, SOLUTION INTRAMUSCULAR; INTRAVENOUS EVERY 5 MIN PRN
Status: DISCONTINUED | OUTPATIENT
Start: 2021-03-22 | End: 2021-03-22 | Stop reason: HOSPADM

## 2021-03-22 RX ORDER — LIDOCAINE HYDROCHLORIDE 20 MG/ML
INJECTION, SOLUTION INFILTRATION; PERINEURAL PRN
Status: DISCONTINUED | OUTPATIENT
Start: 2021-03-22 | End: 2021-03-22 | Stop reason: SDUPTHER

## 2021-03-22 RX ORDER — GLYCOPYRROLATE 1 MG/5 ML
SYRINGE (ML) INTRAVENOUS PRN
Status: DISCONTINUED | OUTPATIENT
Start: 2021-03-22 | End: 2021-03-22 | Stop reason: SDUPTHER

## 2021-03-22 RX ADMIN — EPHEDRINE SULFATE 10 MG: 50 INJECTION INTRAVENOUS at 14:45

## 2021-03-22 RX ADMIN — HYDROMORPHONE HYDROCHLORIDE 1 MG: 2 INJECTION INTRAMUSCULAR; INTRAVENOUS; SUBCUTANEOUS at 14:41

## 2021-03-22 RX ADMIN — FENTANYL CITRATE 100 MCG: 50 INJECTION, SOLUTION INTRAMUSCULAR; INTRAVENOUS at 13:07

## 2021-03-22 RX ADMIN — SODIUM CHLORIDE: 9 INJECTION, SOLUTION INTRAVENOUS at 14:19

## 2021-03-22 RX ADMIN — INSULIN LISPRO 3 UNITS: 100 INJECTION, SOLUTION INTRAVENOUS; SUBCUTANEOUS at 21:04

## 2021-03-22 RX ADMIN — ATENOLOL 25 MG: 25 TABLET ORAL at 08:36

## 2021-03-22 RX ADMIN — CEFAZOLIN 2 G: 10 INJECTION, POWDER, FOR SOLUTION INTRAVENOUS at 13:05

## 2021-03-22 RX ADMIN — EPHEDRINE SULFATE 10 MG: 50 INJECTION INTRAVENOUS at 13:49

## 2021-03-22 RX ADMIN — Medication 100 MCG: at 13:45

## 2021-03-22 RX ADMIN — FENTANYL CITRATE 100 MCG: 50 INJECTION, SOLUTION INTRAMUSCULAR; INTRAVENOUS at 13:33

## 2021-03-22 RX ADMIN — ROCURONIUM BROMIDE 5 MG: 10 SOLUTION INTRAVENOUS at 13:10

## 2021-03-22 RX ADMIN — DEXAMETHASONE SODIUM PHOSPHATE 10 MG: 10 INJECTION INTRAMUSCULAR; INTRAVENOUS at 13:26

## 2021-03-22 RX ADMIN — OXYCODONE 10 MG: 5 TABLET ORAL at 00:58

## 2021-03-22 RX ADMIN — OXYCODONE 10 MG: 5 TABLET ORAL at 06:51

## 2021-03-22 RX ADMIN — SODIUM CHLORIDE: 4.5 INJECTION, SOLUTION INTRAVENOUS at 18:02

## 2021-03-22 RX ADMIN — EPHEDRINE SULFATE 5 MG: 50 INJECTION INTRAVENOUS at 14:43

## 2021-03-22 RX ADMIN — EPHEDRINE SULFATE 10 MG: 50 INJECTION INTRAVENOUS at 14:00

## 2021-03-22 RX ADMIN — HYDROMORPHONE HYDROCHLORIDE 0.5 MG: 1 INJECTION, SOLUTION INTRAMUSCULAR; INTRAVENOUS; SUBCUTANEOUS at 16:16

## 2021-03-22 RX ADMIN — HYDROMORPHONE HYDROCHLORIDE 0.5 MG: 1 INJECTION, SOLUTION INTRAMUSCULAR; INTRAVENOUS; SUBCUTANEOUS at 16:45

## 2021-03-22 RX ADMIN — PROPOFOL 140 MG: 10 INJECTION, EMULSION INTRAVENOUS at 13:10

## 2021-03-22 RX ADMIN — DOCUSATE SODIUM 50 MG AND SENNOSIDES 8.6 MG 1 TABLET: 8.6; 5 TABLET, FILM COATED ORAL at 21:03

## 2021-03-22 RX ADMIN — GABAPENTIN 100 MG: 100 CAPSULE ORAL at 21:03

## 2021-03-22 RX ADMIN — INSULIN LISPRO 4 UNITS: 100 INJECTION, SOLUTION INTRAVENOUS; SUBCUTANEOUS at 18:03

## 2021-03-22 RX ADMIN — SODIUM CHLORIDE: 9 INJECTION, SOLUTION INTRAVENOUS at 10:57

## 2021-03-22 RX ADMIN — CEFAZOLIN 2000 MG: 10 INJECTION, POWDER, FOR SOLUTION INTRAVENOUS at 21:03

## 2021-03-22 RX ADMIN — ROCURONIUM BROMIDE 45 MG: 10 SOLUTION INTRAVENOUS at 13:18

## 2021-03-22 RX ADMIN — SODIUM CHLORIDE, PRESERVATIVE FREE 10 ML: 5 INJECTION INTRAVENOUS at 00:59

## 2021-03-22 RX ADMIN — INSULIN GLARGINE 14 UNITS: 100 INJECTION, SOLUTION SUBCUTANEOUS at 00:52

## 2021-03-22 RX ADMIN — Medication 0.2 MG: at 13:28

## 2021-03-22 RX ADMIN — GABAPENTIN 100 MG: 100 CAPSULE ORAL at 00:52

## 2021-03-22 RX ADMIN — ROCURONIUM BROMIDE 20 MG: 10 SOLUTION INTRAVENOUS at 14:25

## 2021-03-22 RX ADMIN — EPHEDRINE SULFATE 5 MG: 50 INJECTION INTRAVENOUS at 15:35

## 2021-03-22 RX ADMIN — SODIUM CHLORIDE, PRESERVATIVE FREE 10 ML: 5 INJECTION INTRAVENOUS at 10:57

## 2021-03-22 RX ADMIN — INSULIN GLARGINE 14 UNITS: 100 INJECTION, SOLUTION SUBCUTANEOUS at 21:04

## 2021-03-22 RX ADMIN — FAMOTIDINE 20 MG: 20 TABLET ORAL at 00:52

## 2021-03-22 RX ADMIN — ONDANSETRON 4 MG: 2 INJECTION INTRAMUSCULAR; INTRAVENOUS at 13:26

## 2021-03-22 RX ADMIN — FENTANYL CITRATE 50 MCG: 50 INJECTION, SOLUTION INTRAMUSCULAR; INTRAVENOUS at 13:14

## 2021-03-22 RX ADMIN — OXYCODONE 5 MG: 5 TABLET ORAL at 22:08

## 2021-03-22 RX ADMIN — INSULIN LISPRO 2 UNITS: 100 INJECTION, SOLUTION INTRAVENOUS; SUBCUTANEOUS at 00:51

## 2021-03-22 RX ADMIN — Medication 200 MCG: at 13:26

## 2021-03-22 RX ADMIN — MIDAZOLAM HYDROCHLORIDE 2 MG: 2 INJECTION, SOLUTION INTRAMUSCULAR; INTRAVENOUS at 13:05

## 2021-03-22 RX ADMIN — OXYCODONE 10 MG: 5 TABLET ORAL at 10:54

## 2021-03-22 RX ADMIN — FAMOTIDINE 20 MG: 20 TABLET ORAL at 21:03

## 2021-03-22 RX ADMIN — Medication 100 MCG: at 13:28

## 2021-03-22 RX ADMIN — FAMOTIDINE 20 MG: 10 INJECTION, SOLUTION INTRAVENOUS at 12:27

## 2021-03-22 RX ADMIN — HYDROMORPHONE HYDROCHLORIDE 0.5 MG: 1 INJECTION, SOLUTION INTRAMUSCULAR; INTRAVENOUS; SUBCUTANEOUS at 17:07

## 2021-03-22 RX ADMIN — LIDOCAINE HYDROCHLORIDE 3 ML: 20 INJECTION, SOLUTION INFILTRATION; PERINEURAL at 13:10

## 2021-03-22 RX ADMIN — HYDROMORPHONE HYDROCHLORIDE 0.5 MG: 1 INJECTION, SOLUTION INTRAMUSCULAR; INTRAVENOUS; SUBCUTANEOUS at 16:53

## 2021-03-22 RX ADMIN — SODIUM CHLORIDE, SODIUM LACTATE, POTASSIUM CHLORIDE, AND CALCIUM CHLORIDE: .6; .31; .03; .02 INJECTION, SOLUTION INTRAVENOUS at 15:50

## 2021-03-22 RX ADMIN — SUGAMMADEX 200 MG: 100 INJECTION, SOLUTION INTRAVENOUS at 15:46

## 2021-03-22 RX ADMIN — SODIUM CHLORIDE: 9 INJECTION, SOLUTION INTRAVENOUS at 11:42

## 2021-03-22 RX ADMIN — SUCCINYLCHOLINE CHLORIDE 120 MG: 20 INJECTION, SOLUTION INTRAMUSCULAR; INTRAVENOUS at 13:10

## 2021-03-22 ASSESSMENT — PULMONARY FUNCTION TESTS
PIF_VALUE: 36
PIF_VALUE: 29
PIF_VALUE: 31
PIF_VALUE: 29
PIF_VALUE: 28
PIF_VALUE: 27
PIF_VALUE: 27
PIF_VALUE: 30
PIF_VALUE: 29
PIF_VALUE: 29
PIF_VALUE: 28
PIF_VALUE: 28
PIF_VALUE: 30
PIF_VALUE: 1
PIF_VALUE: 29
PIF_VALUE: 1
PIF_VALUE: 29
PIF_VALUE: 33
PIF_VALUE: 0
PIF_VALUE: 28
PIF_VALUE: 30
PIF_VALUE: 31
PIF_VALUE: 0
PIF_VALUE: 29
PIF_VALUE: 28
PIF_VALUE: 31
PIF_VALUE: 30
PIF_VALUE: 28
PIF_VALUE: 28
PIF_VALUE: 5
PIF_VALUE: 34
PIF_VALUE: 1
PIF_VALUE: 28
PIF_VALUE: 29
PIF_VALUE: 28
PIF_VALUE: 30
PIF_VALUE: 29
PIF_VALUE: 28
PIF_VALUE: 29
PIF_VALUE: 30
PIF_VALUE: 28
PIF_VALUE: 29
PIF_VALUE: 28
PIF_VALUE: 31
PIF_VALUE: 28
PIF_VALUE: 28
PIF_VALUE: 29
PIF_VALUE: 28
PIF_VALUE: 28
PIF_VALUE: 4
PIF_VALUE: 28
PIF_VALUE: 29
PIF_VALUE: 0
PIF_VALUE: 30
PIF_VALUE: 28
PIF_VALUE: 34
PIF_VALUE: 29
PIF_VALUE: 2
PIF_VALUE: 28
PIF_VALUE: 28
PIF_VALUE: 30
PIF_VALUE: 29
PIF_VALUE: 1
PIF_VALUE: 31
PIF_VALUE: 30
PIF_VALUE: 28
PIF_VALUE: 30
PIF_VALUE: 32
PIF_VALUE: 28
PIF_VALUE: 27
PIF_VALUE: 29
PIF_VALUE: 4
PIF_VALUE: 28
PIF_VALUE: 33
PIF_VALUE: 29
PIF_VALUE: 28
PIF_VALUE: 28

## 2021-03-22 ASSESSMENT — PAIN SCALES - GENERAL
PAINLEVEL_OUTOF10: 7
PAINLEVEL_OUTOF10: 7
PAINLEVEL_OUTOF10: 8
PAINLEVEL_OUTOF10: 7

## 2021-03-22 ASSESSMENT — ENCOUNTER SYMPTOMS: SHORTNESS OF BREATH: 0

## 2021-03-22 ASSESSMENT — PAIN DESCRIPTION - FREQUENCY: FREQUENCY: CONTINUOUS

## 2021-03-22 ASSESSMENT — PAIN DESCRIPTION - PAIN TYPE: TYPE: SURGICAL PAIN

## 2021-03-22 ASSESSMENT — PAIN DESCRIPTION - PROGRESSION: CLINICAL_PROGRESSION: GRADUALLY WORSENING

## 2021-03-22 ASSESSMENT — PAIN DESCRIPTION - DESCRIPTORS: DESCRIPTORS: SHARP;DISCOMFORT

## 2021-03-22 ASSESSMENT — LIFESTYLE VARIABLES: SMOKING_STATUS: 0

## 2021-03-22 ASSESSMENT — PAIN DESCRIPTION - LOCATION: LOCATION: ARM

## 2021-03-22 NOTE — CONSULTS
Nephrology Consult Note                                                                                                                                                                                                                                                                                                                                                               Office : [phone]     Fax :[phone]              Patient's Name: Abi Ponce  11:17 AM  3/22/2021    Reason for Consult:  Hyponatremia      Requesting Physician:  Nicolette Roth MD      Chief Complaint:  Hip fracture    History of Present Ilness:    Abi Ponce is a 57 y.o. female with PMH of DM type 2, HTN presented to ED after she had syncope episode  She is left sided hip , wrist fracture       Nephrology consult for hyponatremia management    Per patient she drinks enough water     She took NSAIDs since last few days    Not on diuretics           Past Medical History:   Diagnosis Date    Hypertension     Type II or unspecified type diabetes mellitus without mention of complication, not stated as uncontrolled        History reviewed. No pertinent surgical history. History reviewed. No pertinent family history. reports that she has never smoked. She has never used smokeless tobacco. She reports that she does not drink alcohol.     Allergies:  Ace inhibitors    Current Medications:    ceFAZolin (ANCEF) 2000 mg in dextrose 5 % 100 mL IVPB, On Call to OR  0.9 % sodium chloride infusion, Continuous  morphine (PF) injection 2 mg, Q4H PRN  escitalopram (LEXAPRO) tablet 10 mg, Daily  famotidine (PEPCID) tablet 20 mg, BID  gabapentin (NEURONTIN) capsule 100 mg, BID  atenolol (TENORMIN) tablet 25 mg, Daily  amLODIPine (NORVASC) tablet 5 mg, Daily  insulin glargine (LANTUS) injection vial 14 Units, Nightly  insulin lispro (HUMALOG) injection vial 0-12 Units, TID WC  insulin lispro (HUMALOG) injection vial 0-6 Units, Nightly glucose (GLUTOSE) 40 % oral gel 15 g, PRN  dextrose 50 % IV solution, PRN  glucagon (rDNA) injection 1 mg, PRN  dextrose 5 % solution, PRN  sodium chloride flush 0.9 % injection 10 mL, 2 times per day  sodium chloride flush 0.9 % injection 10 mL, PRN  potassium chloride (KLOR-CON M) extended release tablet 40 mEq, PRN    Or  potassium bicarb-citric acid (EFFER-K) effervescent tablet 40 mEq, PRN    Or  potassium chloride 10 mEq/100 mL IVPB (Peripheral Line), PRN  magnesium sulfate 2000 mg in 50 mL IVPB premix, PRN  senna (SENOKOT) tablet 8.6 mg, Daily PRN  acetaminophen (TYLENOL) tablet 650 mg, Q6H PRN    Or  acetaminophen (TYLENOL) suppository 650 mg, Q6H PRN  ondansetron (ZOFRAN-ODT) disintegrating tablet 4 mg, Q8H PRN    Or  ondansetron (ZOFRAN) injection 4 mg, Q6H PRN  oxyCODONE (ROXICODONE) immediate release tablet 5 mg, Q4H PRN        Review of Systems:   14 point ROS obtained but were negative except mentioned in HPI      Physical exam:     Vitals:  /84   Pulse 80   Temp 98.2 °F (36.8 °C) (Oral)   Resp 16   Wt 210 lb 1.6 oz (95.3 kg)   SpO2 95%   BMI 38.43 kg/m²   Constitutional:  OAA X3 NAD  Skin: no rash, turgor wnl  Heent:  eomi, mmm  Neck: no bruits or jvd noted  Cardiovascular:  S1, S2 without m/r/g  Respiratory: CTA B without w/r/r  Abdomen:  +bs, soft, nt, nd  Ext:  Left hip tender    Psychiatric: mood and affect appropriate  Musculoskeletal:  Rom, muscular strength intact    Data:   Labs:  CBC:   Recent Labs     03/21/21  1420 03/22/21  0529   WBC 6.1 5.8   HGB 13.8 12.3   * 108*     BMP:    Recent Labs     03/21/21  1420 03/22/21  0529   * 130*   K 3.9 3.8   CL 87* 91*   CO2 28 38*   BUN 6* 8   CREATININE 0.6 <0.5*   GLUCOSE 179* 139*     Ca/Mg/Phos:   Recent Labs     03/21/21  1420 03/22/21  0529   CALCIUM 10.2 10.1     Hepatic:   Recent Labs     03/21/21  1420 03/22/21  0529   AST 29 24   ALT 16 12   BILITOT 1.5* 1.4*   ALKPHOS 92 78     Troponin: No results for input(s): TROPONINI in the last 72 hours. BNP: No results for input(s): BNP in the last 72 hours. Lipids: No results for input(s): CHOL, TRIG, HDL, LDLCALC, LABVLDL in the last 72 hours. ABGs: No results for input(s): PHART, PO2ART, MSH9QTK in the last 72 hours. INR:   Recent Labs     03/21/21  1420   INR 1.23*     UA:No results for input(s): [place] Pippins, GLUCOSEU, BILIRUBINUR, [place], BLOODU, PHUR, PROTEINU, UROBILINOGEN, NITRU, LEUKOCYTESUR, Hillary Rico in the last 72 hours. Urine Microscopic: No results for input(s): LABCAST, BACTERIA, COMU, HYALCAST, WBCUA, RBCUA, EPIU in the last 72 hours. Urine Culture: No results for input(s): LABURIN in the last 72 hours. Urine Chemistry: No results for input(s): Cynthia Pel, PROTEINUR, NAUR in the last 72 hours. IMAGING:  XR CHEST PORTABLE   Final Result   Clear lungs. Cardiomegaly. No acute cardiopulmonary abnormality. XR FEMUR LEFT (MIN 2 VIEWS)    (Results Pending)       Assessment/Plan       1. Hyponatremia        Etiology : could be volume depletion           Took NSAIDs recently               Serum Na trend : 133 -----> 125 ----> 130        Check Urine Na, Cr, U osm, serum Uric acid         Will continue IVF NS @ 100 ml/hr for now and adjust IVF according to         Urine lab results     2. Hip fracture          Ortho following    3. DM type 2     4.  Acid- base/ Electrolyte imbalance : bambi                    Thank you for allowing us to participate in care of [address] [place] [address] free to contact me   [facility] [address]  Office : [phone]  Fax :[phone]

## 2021-03-22 NOTE — PROGRESS NOTES
Patient to [facility] waiting for procedure. Alert and oriented. Stable on room air. Patient with  at bedside. Splint in place to LUE. IVF infusing, Hare cath in place and draining urine. Patient wears upper dentures that she states she left in her room (522).

## 2021-03-22 NOTE — PROGRESS NOTES
03/22/21 1910 -Cardiology consult called in and message left with RNs number provided for callback.    -Lucio Adamson, XOCHITL

## 2021-03-22 NOTE — PROGRESS NOTES
Physical Therapy  Received PT order and chart reviewed: Per orthopedic consult, pt is to have surgery this afternoon for L hip intertrochanteric fracture. Please place new orders after surgery. Thanks!     Sade Alejandro, PT, DPT

## 2021-03-22 NOTE — PROGRESS NOTES
Patient to PACU from OR. Report received from [address] [place] [address] at this time. Patient alert and oriented, stable on 3L of oxygen per NC. VS obtained and filed. Patient denies pain when assessed.

## 2021-03-22 NOTE — PROGRESS NOTES
Hospitalist Progress Note      PCP: Dre Vargas MD    Date of Admission: 3/21/2021    Hospital Course: \"57 y.o. female [facility] to [facility] EDfor left-sided hip, wrist, and foot pain s/p syncope and collapse 3 days PTA. She had syncopal episode associated with dizziness. The patient admits that she drinks approximately 4+ liters of water daily despite recommendations for fluid restriction by physicians. She was initially evaluated [facility] at which time she was diagnosed with left wrist fracture but told that she had no evidence of hip fracture. She was then reevaluated at [facility] ED at which time she does have minimally displaced intertrochanteric fracture of her left hip. Labs were obtained and notable for moderate hyponatremia at 125, thrombocytopenia, and acute hyperglycemia due to uncontrolled diabetes. She was transferred to [facility] for anticipated surgical intervention by orthopedics team.\"       Subjective:   Patient seen postop.     Medications:  Reviewed    Infusion Medications    sodium chloride Stopped (03/22/21 1550)    sodium chloride      dextrose       Scheduled Medications    sodium chloride flush  10 mL Intravenous 2 times per day    sennosides-docusate sodium  1 tablet Oral BID    ceFAZolin (ANCEF) IVPB  2,000 mg Intravenous Q8H    [START ON 3/23/2021] enoxaparin  40 mg Subcutaneous Daily    escitalopram  10 mg Oral Daily    famotidine  20 mg Oral BID    gabapentin  100 mg Oral BID    atenolol  25 mg Oral Daily    amLODIPine  5 mg Oral Daily    insulin glargine  14 Units Subcutaneous Nightly    insulin lispro  0-12 Units Subcutaneous TID WC    insulin lispro  0-6 Units Subcutaneous Nightly    sodium chloride flush  10 mL Intravenous 2 times per day     PRN Meds: morphine, sodium chloride flush, magnesium hydroxide, glucose, dextrose, glucagon (rDNA), dextrose, sodium chloride flush, potassium chloride **OR** potassium alternative oral replacement **OR** potassium chloride, magnesium sulfate, senna, acetaminophen **OR** acetaminophen, ondansetron **OR** ondansetron, oxyCODONE      Intake/Output Summary (Last 24 hours) at 3/22/2021 1803  Last data filed at 3/22/2021 1440  Gross per 24 hour   Intake 2140 ml   Output 1550 ml   Net 590 ml       Physical Exam Performed:    /73   Pulse 78   Temp 97.7 °F (36.5 °C) (Oral)   Resp 16   Wt 210 lb 1.6 oz (95.3 kg)   SpO2 92%   BMI 38.43 kg/m²     General appearance: No apparent distress, appears stated age and cooperative. HEENT: Pupils equal, round, and reactive to light. Conjunctivae/corneas clear. Neck: Supple, with full range of motion. No jugular venous distention. Trachea midline. Respiratory:  Normal respiratory effort. Clear to auscultation, bilaterally without Rales/Wheezes/Rhonchi. Cardiovascular: Regular rate and rhythm with normal S1/S2 without murmurs, rubs or gallops. Abdomen: Soft, non-tender, non-distended with normal bowel sounds. Musculoskeletal: No clubbing, cyanosis or edema bilaterally. Left hip dressing and wrist dressing. Skin: Skin color, texture, turgor normal.  No rashes or lesions. Neurologic:  Neurovascularly intact without any focal sensory/motor deficits. Cranial nerves: II-XII intact, grossly non-focal.  Psychiatric: Alert and oriented, thought content appropriate, normal insight      Labs:   Recent Labs     03/21/21  1420 03/22/21  0529   WBC 6.1 5.8   HGB 13.8 12.3   HCT 40.1 36.3   * 108*     Recent Labs     03/21/21  1420 03/22/21  0529   * 130*   K 3.9 3.8   CL 87* 91*   CO2 28 38*   BUN 6* 8   CREATININE 0.6 <0.5*   CALCIUM 10.2 10.1     Recent Labs     03/21/21  1420 03/22/21  0529   AST 29 24   ALT 16 12   BILITOT 1.5* 1.4*   ALKPHOS 92 78     Recent Labs     03/21/21  1420   INR 1.23*     No results for input(s): Fisher Scrivener in the last 72 hours.     Urinalysis:    No results found for: Roseanna Newell, Asad Grewal, BLOODU, SPECGRAV, [facility]    Radiology:  XR FEMUR LEFT (MIN 2 VIEWS)   Final Result   Intraoperative spot films as above         XR CHEST PORTABLE   Final Result   Clear lungs. Cardiomegaly. No acute cardiopulmonary abnormality. Assessment/Plan:    Active Hospital Problems    Diagnosis    Closed displaced intertrochanteric fracture of left femur ([facility] [S72.142A]    Retained orthopedic hardware [Z96.9]    Closed fracture of neck of left femur (HCC) [S72.002A]    Syncope and collapse [R55]    Hyponatremia [E87.1]    Psychogenic polydipsia [R63.1, F54]    Acute hyperglycemia [R73.9]    DM (diabetes mellitus), type 2, uncontrolled ([facility] [E11.65]    Closed fracture of distal end of left radius [S52.502A]    Thrombocytopenia (HCC) [D69.6]    Peripheral neuropathy [G62.9]    Essential hypertension [I10]        Left femoral neck fracture/left distal radius fracture  S/p left wrist ORIF and left hip nail  -Pain control  -PT/OT  -Regimen as per orthopedic surgery     Syncope and collapse-possibly due to hyponatremia  Fall precautions in place  Check echocardiogram  -Telemetry     Hyponatremia-due to hypovolemia  Continue IV hydration  Nephrology following     Uncontrolled type II DM with acute hyperglycemia  A1c 5.5  Continue insulin coverage     Periorbital hematoma status post head contusion/trauma  Patient reports head CT performed at initial evaluation and noted to be negative for acute abnormality  Fall parameters in place    DVT Prophylaxis: SCDs, Lovenox tomorrow  Diet: DIET GENERAL; Carb Control: 4 carb choices (60 gms)/meal; No Added Salt (3-4 GM);  Daily Fluid Restriction: 1800 ml  Dietary Nutrition Supplements: Standard High Calorie Oral Supplement  Code Status: Full Code    PT/OT Eval Status: Ordered    Dispo -DC to SNF when okay with orthopedic surgery    Terence Loja MD

## 2021-03-22 NOTE — PROGRESS NOTES
03/22/21 1685 -Nephrology consult called in to [facility] [address] and page sent out to Dr. Jens Marie with RNs number provided for callback.    -Lucio Adamson, XOCHITL

## 2021-03-22 NOTE — ANESTHESIA POSTPROCEDURE EVALUATION
Department of Anesthesiology  Postprocedure Note    Patient: Rigo Powell  MRN: [id number]  [place]: 1960  Date of evaluation: 3/22/2021  Time:  5:06 PM     Procedure Summary     Date: 03/22/21 [facility]    Anesthesia Start: 0744 Anesthesia Stop: 7757    Procedures:       LEFT GAMMA NAIL INSERTION LAURA (Left Hip)      LEFT WRIST OPEN REDUCTION INTERNAL FIXATION (Left Wrist) Diagnosis:       Closed fracture of left femur, unspecified fracture morphology, unspecified portion of femur, initial encounter ([facility]      Closed fracture of left wrist, initial encounter      (LEFT RADIUS FRACTURE AND LEFT FEMUR FRACTURE)    Surgeons: Will Hernandez MD Responsible Provider: Lissette Mandujano MD    Anesthesia Type: general ASA Status: 3          Anesthesia Type: general    Lily Phase I: Lily Score: 10    Lily Phase II:      Last vitals: Reviewed and per EMR flowsheets.      Vitals:    03/21/21 1750 03/21/21 2045 03/22/21 0700 03/22/21 1601   BP: 134/75 120/75 122/84 (!) 123/57   Pulse: 64 74 80 73   Resp: 16 16 16 18   Temp: 97.7 °F (36.5 °C) 98.9 °F (37.2 °C) 98.2 °F (36.8 °C) 96.5 °F (35.8 °C)   TempSrc: Oral Oral Oral Temporal   SpO2: 95%   98%   Weight:  210 lb 1.6 oz (95.3 kg)       Anesthesia Post Evaluation    Patient location during evaluation: bedside  Patient participation: complete - patient participated  Level of consciousness: awake and alert  Airway patency: patent  Nausea & Vomiting: no nausea  Complications: no  Cardiovascular status: hemodynamically stable  Respiratory status: acceptable  Hydration status: euvolemic

## 2021-03-22 NOTE — PROGRESS NOTES
Occupational Therapy  Received OT order to evaluate pt. Chart reviewed. Pt is to have orthopedic surgery this afternoon for L hip intertrchanteric fx and L radius fx. Please place new orders for therapy after surgery. Thank you.     Lorraine Savage, S/OT

## 2021-03-22 NOTE — CARE COORDINATION
CM unable to assess pt at this time. Pt currently in surgery. CM will reach out to pt post op and will assess at that time.  Marium Elder

## 2021-03-22 NOTE — PROGRESS NOTES
Pt assessment completed and charted. VSS. Pt a/o. PRN medication being administered per [facility] patent. Bed in lowest position and wheels locked. Call light within reach. Bedside table within reach. Non-skid footwear in place. Pt denies any other needs at this time. Pt calls out appropriately. Will continue to monitor.

## 2021-03-22 NOTE — ANESTHESIA PRE PROCEDURE
Department of Anesthesiology  Preprocedure Note       Name:  Yvette Gordon   Age:  57 y.o.  :  1960                                          MRN:  [id number]         Date:  3/22/2021      Surgeon: Brody Paulson):  Cb Linder MD    Procedure: Procedure(s):  LEFT GAMMA NAIL INSERTION LAURA  LEFT WRIST OPEN REDUCTION INTERNAL FIXATION    Medications prior to admission:   Prior to Admission medications    Medication Sig Start Date End Date Taking? Authorizing Provider   gabapentin (NEURONTIN) 100 MG capsule TAKE ONE CAPSULE BY MOUTH TWICE A DAY 3/5/21 4/2/21  Darrell Urias MD   blood glucose test strips (ASCENSIA AUTODISC VI;ONE TOUCH ULTRA TEST VI) strip USE TO TEST DAILY. DX;E11.9 10/1/20   Darrell Urias MD   Blood Glucose Monitoring Suppl (Core StixOGER BLOOD GLUCOSE KIT) KIT USE TO TEST DAILY.   DX:E11.9 10/1/20   Darrell Urias MD   oxybutynin (DITROPAN) 5 MG tablet Take 1 tablet by mouth 3 times daily 20   Darrell Urias MD   escitalopram (LEXAPRO) 10 MG tablet Take 1 tablet by mouth daily 20   Darrell Urias MD   metFORMIN (GLUCOPHAGE) 1000 MG tablet TAKE ONE TABLET BY MOUTH TWICE A DAY 20   Darrell Urias MD   glyBURIDE (DIABETA) 5 MG tablet TAKE ONE TABLET BY MOUTH DAILY 20   Darrell Urias MD   amLODIPine (NORVASC) 5 MG tablet Take 1 tablet by mouth daily 20   Darrell Urias MD   atenolol (TENORMIN) 25 MG tablet TAKE ONE TABLET BY MOUTH DAILY 20   Darrell Urias MD   famotidine (PEPCID) 20 MG tablet Take 1 tablet by mouth 2 times daily 20   Darrell Urias MD   raNITIdine (ZANTAC) 150 MG tablet TAKE ONE TABLET BY MOUTH TWICE A DAY 3/13/20   Darrell Urias MD       Current medications:    Current Facility-Administered Medications   Medication Dose Route Frequency Provider Last Rate Last Admin    ceFAZolin (ANCEF) 2000 mg in dextrose 5 % 100 mL Line)  10 mEq Intravenous PRN Caleb Mann MD        magnesium sulfate 2000 mg in 50 mL IVPB premix  2,000 mg Intravenous PRN Caleb Mann MD        senna (SENOKOT) tablet 8.6 mg  1 tablet Oral Daily PRN Caleb Mann MD        acetaminophen (TYLENOL) tablet 650 mg  650 mg Oral Q6H PRN Caleb Mann MD        Or   Pauline Orellana acetaminophen (TYLENOL) suppository 650 mg  650 mg Rectal Q6H PRN Caleb Mann MD        ondansetron (ZOFRAN-ODT) disintegrating tablet 4 mg  4 mg Oral Q8H PRN Caleb Mann MD        Or    ondansetron [facility]) injection 4 mg  4 mg Intravenous Q6H PRN Caleb Mann MD        oxyCODONE (ROXICODONE) immediate release tablet 5 mg  5 mg Oral Q4H PRN Ramnó Kemp,            Allergies: Allergies   Allergen Reactions    Ace Inhibitors        Problem List:    Patient Active Problem List   Diagnosis Code    Peripheral neuropathy G62.9    Essential hypertension I10    Type 2 diabetes mellitus without complication ([address] D40.2    Urinary incontinence, urge N39.41    Depression F32.9    Closed fracture of neck of left femur ([address] S72.002A    Syncope and collapse R55    Hyponatremia E87.1    Psychogenic polydipsia R63.1, F54    Acute hyperglycemia R73.9    DM (diabetes mellitus), type 2, uncontrolled ([address] E11.65    Closed fracture of distal end of left radius S52.502A    Thrombocytopenia (HCC) D69.6       Past Medical History:        Diagnosis Date    Hypertension     Type II or unspecified type diabetes mellitus without mention of complication, not stated as uncontrolled        Past Surgical History:  History reviewed. No pertinent surgical history. Social History:    Social History     Tobacco Use    Smoking status: Never Smoker    Smokeless tobacco: Never Used   Substance Use Topics    Alcohol use:  No                                Counseling given: Not Answered      Vital Signs (Current):   Vitals:    03/21/21 1750 03/21/21 2045 03/22/21 0700   BP: 134/75 120/75 122/84   Pulse: 64 74 80   Resp: 16 16 16   Temp: 97.7 °F (36.5 °C) 98.9 °F (37.2 °C) 98.2 °F (36.8 °C)   TempSrc: Oral Oral Oral   SpO2: 95%     Weight:  210 lb 1.6 oz (95.3 kg)                                               BP Readings from Last 3 Encounters:   03/22/21 122/84   03/21/21 115/69   09/22/20 130/82       NPO Status:  mn+, see mar                                                                               BMI:   Wt Readings from Last 3 Encounters:   03/21/21 210 lb 1.6 oz (95.3 kg)   03/21/21 201 lb (91.2 kg)   09/22/20 202 lb (91.6 kg)     Body mass index is 38.43 kg/m².     CBC:   Lab Results   Component Value Date    WBC 5.8 03/22/2021    RBC 3.76 03/22/2021    HGB 12.3 03/22/2021    HCT 36.3 03/22/2021    MCV 96.6 03/22/2021    RDW 14.0 03/22/2021     03/22/2021       CMP:   Lab Results   Component Value Date     03/22/2021    K 3.8 03/22/2021    CL 91 03/22/2021    CO2 38 03/22/2021    BUN 8 03/22/2021    CREATININE <0.5 03/22/2021    GFRAA >60 03/22/2021    AGRATIO 1.1 03/22/2021    LABGLOM >60 03/22/2021    LABGLOM 94 08/24/2015    GLUCOSE 139 03/22/2021    PROT 7.5 03/22/2021    CALCIUM 10.1 03/22/2021    BILITOT 1.4 03/22/2021    ALKPHOS 78 03/22/2021    AST 24 03/22/2021    ALT 12 03/22/2021       POC Tests:   Recent Labs     03/22/21  0838   POCGLU 185*       Coags:   Lab Results   Component Value Date    PROTIME 14.3 03/21/2021    INR 1.23 03/21/2021       HCG (If Applicable): No results found for: PREGTESTUR, PREGSERUM, HCG, HCGQUANT     ABGs: No results found for: PHART, PO2ART, IKK5BIC, EUR7RLR, BEART, V7XHFUMA     Type & Screen (If Applicable):  No results found for: LABABO, LABRH    Drug/Infectious Status (If Applicable):  No results found for: HIV, HEPCAB    COVID-19 Screening (If Applicable):   Lab Results   Component Value Date    COVID19 Not Detected 03/22/2021           Anesthesia Evaluation  Patient summary reviewed no history of anesthetic complications:   Airway: Mallampati: III  TM distance: <3 FB   Neck ROM: limited  Mouth opening: > = 3 FB Dental:    (+) upper dentures      Pulmonary:Negative Pulmonary ROS breath sounds clear to auscultation      (-) COPD, asthma, shortness of breath, recent URI, sleep apnea and not a current smoker          Patient did not smoke on day of surgery. Cardiovascular:    (+) hypertension:,     (-) pacemaker, past MI, CAD, CABG/stent, dysrhythmias,  angina and  CHF    ECG reviewed  Rhythm: regular  Rate: normal           Beta Blocker:  Dose within 24 Hrs         Neuro/Psych:   (+) neuromuscular disease (neuropathy, feet / dm):, depression/anxiety  (hx dep.)   (-) seizures, TIA and CVA           GI/Hepatic/Renal: Neg GI/Hepatic/Renal ROS       (-) GERD, liver disease, no renal disease, bowel prep and no morbid obesity       Endo/Other:    (+) DiabetesType II DM, , blood dyscrasia (dec. plts): arthritis:., electrolyte abnormalities, no malignancy/cancer. (-) hypothyroidism, hyperthyroidism, no malignancy/cancer               Abdominal:           Vascular: negative vascular ROS. Anesthesia Plan      general     ASA 3       Induction: intravenous. MIPS: Postoperative opioids intended and Prophylactic antiemetics administered. Anesthetic plan and risks discussed with patient and spouse. Plan discussed with CRNA. This pre-anesthesia assessment may be used as a history and physical.    DOS STAFF ADDENDUM:    Pt seen and examined, chart reviewed (including anesthesia, drug and allergy history). No interval changes to history and physical examination. Anesthetic plan, risks, benefits, alternatives, and personnel involved discussed with patient. Patient verbalized an understanding and agrees to proceed.       Archie Salazar MD  March 22, 2021  11:52 AM      Archie Salazar MD   3/22/2021

## 2021-03-22 NOTE — CONSULTS
Nephrology consult received    Labs reviewed    Orders placed    Full consult note to follow    Thank you

## 2021-03-22 NOTE — BRIEF OP NOTE
Brief Postoperative Note      Patient: Liborio Vallejo  YOB: 1960  MRN: [id number]    Date of Procedure: 3/22/2021    Pre-Op Diagnosis: LEFT RADIUS FRACTURE AND LEFT FEMUR FRACTURE    Post-Op Diagnosis: Same       Procedure(s):  LEFT GAMMA NAIL INSERTION LAURA  LEFT WRIST OPEN REDUCTION INTERNAL FIXATION    Surgeon(s):  Ralph Wallace MD    Assistant:  Surgical Assistant: Leyla Peres    Anesthesia: General    Estimated Blood Loss (mL): 013     Complications: None    Specimens:   * No specimens in log *    Implants:  Implant Name Type Inv. Item Serial No.  Lot No. LRB No. Used Action   KWIRE [id number]  KWIRE [id number]  [employer] [facility] [id number] Left 1 Explanted   NAIL IM [id number] [id number] 125DEG TROCHANTERIC FEM TI TIM ASHLEY  NAIL IM L170MM [id number] 125DEG TROCHANTERIC FEM TI TIM ASHLEY  [employer] [facility] [id number] Left 1 Implanted   SCREW BNE L105MM DIA10.5MM CANC HIP TI ST TIM ASHLEY  SCREW BNE L105MM DIA10.5MM CANC HIP TI ST TIM ASHLEY  [employer] [facility] [id number] Left 1 Implanted   SCREW BNE L40MM DIA5MM ASHLEY FOR T2 ALPHA NAILING SYS  SCREW BNE L40MM DIA5MM ASHLEY FOR T2 ALPHA NAILING SYS  [employer]UR[employer] [facility]- [id number] Left 1 Implanted   SCREW BNE L12MM OD27MM ST ASHLEY FULL THRD T8 DRV  SCREW BNE L12MM OD27MM ST ASHLEY FULL THRD T8 DRV  [employer]UR[employer] [facility]  Left 1 Implanted   SCREW BNE L18MM OD27MM ST ASHLEY FULL THRD T8 DRV  SCREW BNE L18MM OD27MM ST ASHLEY FULL THRD T8 DRV  [employer] [facility]  Left 1 Implanted   SCREW LOCKING [id number]  SCREW LOCKING [id number]  [employer] [facility]  Left 4 Implanted   SCREW BNE L22MM OD27MM ST ASHLEY FULL THRD T8 DRV  SCREW BNE L22MM OD27MM ST ASHLEY FULL THRD T8 DRV  [employer] [facility]  Left 2 Implanted   SCREW T8 BONE 2. [id number]  SCREW T8 BONE 2. [id number]  [employer] [facility]WD  Left 1 Implanted   PLATE BNE 10 H EXTRASHORT L DST RAD VOLAR INTMED  PLATE BNE 10 H EXTRASHORT L DST RAD VOLAR INTMED  [employer] HOW-WD  Left 1 Implanted Drains:   Urethral Catheter Non-latex;Straight-tip 16 fr (Active)   Catheter Indications Perioperative use in selected surgeries including but not limited to urologic, pelvic or need for intraoperative monitoring of urinary output due to prolonged surgery, large volume infusion or need for diuretic therapy in surgery 03/21/21 1517   Urine Color Elaine 03/22/21 1601   Urine Appearance Clear 03/22/21 1601   Urine Odor Malodorous 03/22/21 0550   Output (mL) 1150 mL 03/22/21 0550       Findings: Same    Electronically signed by Glo Diana MD on 3/22/2021 at 4:46 PM

## 2021-03-22 NOTE — PLAN OF CARE
Problem: Pain:  Goal: Control of acute pain  Description: Control of acute pain  Outcome: Ongoing     Problem: Falls - Risk of:  Goal: Will remain free from falls  Description: Will remain free from falls  Outcome: Met This Shift

## 2021-03-23 LAB
ANION GAP SERPL CALCULATED.3IONS-SCNC: 10 MMOL/L (ref 3–16)
BUN BLDV-MCNC: 9 MG/DL (ref 7–20)
CALCIUM SERPL-MCNC: 9.3 MG/DL (ref 8.3–10.6)
CHLORIDE BLD-SCNC: 90 MMOL/L (ref 99–110)
CO2: 25 MMOL/L (ref 21–32)
CREAT SERPL-MCNC: <0.5 MG/DL (ref 0.6–1.2)
GFR AFRICAN AMERICAN: >60
GFR NON-AFRICAN AMERICAN: >60
GLUCOSE BLD-MCNC: 173 MG/DL (ref 70–99)
GLUCOSE BLD-MCNC: 216 MG/DL (ref 70–99)
GLUCOSE BLD-MCNC: 243 MG/DL (ref 70–99)
GLUCOSE BLD-MCNC: 260 MG/DL (ref 70–99)
GLUCOSE BLD-MCNC: 267 MG/DL (ref 70–99)
HCT VFR BLD CALC: 28.9 % (ref 36–48)
HEMOGLOBIN: 10 G/DL (ref 12–16)
PERFORMED ON: ABNORMAL
POTASSIUM SERPL-SCNC: 4.5 MMOL/L (ref 3.5–5.1)
SODIUM BLD-SCNC: 125 MMOL/L (ref 136–145)

## 2021-03-23 PROCEDURE — 97530 THERAPEUTIC ACTIVITIES: CPT

## 2021-03-23 PROCEDURE — 6370000000 HC RX 637 (ALT 250 FOR IP): Performed by: INTERNAL MEDICINE

## 2021-03-23 PROCEDURE — 97166 OT EVAL MOD COMPLEX 45 MIN: CPT

## 2021-03-23 PROCEDURE — 97110 THERAPEUTIC EXERCISES: CPT

## 2021-03-23 PROCEDURE — APPNB30 APP NON BILLABLE TIME 0-30 MINS: Performed by: PHYSICIAN ASSISTANT

## 2021-03-23 PROCEDURE — 97162 PT EVAL MOD COMPLEX 30 MIN: CPT

## 2021-03-23 PROCEDURE — 2580000003 HC RX 258: Performed by: ORTHOPAEDIC SURGERY

## 2021-03-23 PROCEDURE — 6370000000 HC RX 637 (ALT 250 FOR IP): Performed by: ORTHOPAEDIC SURGERY

## 2021-03-23 PROCEDURE — 85018 HEMOGLOBIN: CPT

## 2021-03-23 PROCEDURE — 80048 BASIC METABOLIC PNL TOTAL CA: CPT

## 2021-03-23 PROCEDURE — 85014 HEMATOCRIT: CPT

## 2021-03-23 PROCEDURE — 1200000000 HC SEMI PRIVATE

## 2021-03-23 PROCEDURE — 6360000002 HC RX W HCPCS: Performed by: ORTHOPAEDIC SURGERY

## 2021-03-23 PROCEDURE — 36415 COLL VENOUS BLD VENIPUNCTURE: CPT

## 2021-03-23 PROCEDURE — 99253 IP/OBS CNSLTJ NEW/EST LOW 45: CPT | Performed by: INTERNAL MEDICINE

## 2021-03-23 RX ORDER — INSULIN GLARGINE 100 [IU]/ML
18 INJECTION, SOLUTION SUBCUTANEOUS NIGHTLY
Status: DISCONTINUED | OUTPATIENT
Start: 2021-03-23 | End: 2021-03-24

## 2021-03-23 RX ADMIN — OXYCODONE 5 MG: 5 TABLET ORAL at 07:02

## 2021-03-23 RX ADMIN — SODIUM CHLORIDE, PRESERVATIVE FREE 10 ML: 5 INJECTION INTRAVENOUS at 20:30

## 2021-03-23 RX ADMIN — GABAPENTIN 100 MG: 100 CAPSULE ORAL at 09:12

## 2021-03-23 RX ADMIN — GABAPENTIN 100 MG: 100 CAPSULE ORAL at 20:23

## 2021-03-23 RX ADMIN — DOCUSATE SODIUM 50 MG AND SENNOSIDES 8.6 MG 1 TABLET: 8.6; 5 TABLET, FILM COATED ORAL at 09:12

## 2021-03-23 RX ADMIN — INSULIN LISPRO 2 UNITS: 100 INJECTION, SOLUTION INTRAVENOUS; SUBCUTANEOUS at 17:39

## 2021-03-23 RX ADMIN — ATENOLOL 25 MG: 25 TABLET ORAL at 09:12

## 2021-03-23 RX ADMIN — INSULIN LISPRO 3 UNITS: 100 INJECTION, SOLUTION INTRAVENOUS; SUBCUTANEOUS at 20:24

## 2021-03-23 RX ADMIN — MORPHINE SULFATE 2 MG: 2 INJECTION, SOLUTION INTRAMUSCULAR; INTRAVENOUS at 09:43

## 2021-03-23 RX ADMIN — DOCUSATE SODIUM 50 MG AND SENNOSIDES 8.6 MG 1 TABLET: 8.6; 5 TABLET, FILM COATED ORAL at 20:23

## 2021-03-23 RX ADMIN — FAMOTIDINE 20 MG: 20 TABLET ORAL at 20:23

## 2021-03-23 RX ADMIN — ENOXAPARIN SODIUM 40 MG: 40 INJECTION SUBCUTANEOUS at 09:12

## 2021-03-23 RX ADMIN — INSULIN GLARGINE 18 UNITS: 100 INJECTION, SOLUTION SUBCUTANEOUS at 20:33

## 2021-03-23 RX ADMIN — INSULIN LISPRO 4 UNITS: 100 INJECTION, SOLUTION INTRAVENOUS; SUBCUTANEOUS at 09:14

## 2021-03-23 RX ADMIN — CEFAZOLIN 2000 MG: 10 INJECTION, POWDER, FOR SOLUTION INTRAVENOUS at 04:59

## 2021-03-23 RX ADMIN — FAMOTIDINE 20 MG: 20 TABLET ORAL at 09:12

## 2021-03-23 RX ADMIN — SODIUM CHLORIDE: 9 INJECTION, SOLUTION INTRAVENOUS at 07:03

## 2021-03-23 RX ADMIN — INSULIN LISPRO 4 UNITS: 100 INJECTION, SOLUTION INTRAVENOUS; SUBCUTANEOUS at 11:59

## 2021-03-23 RX ADMIN — OXYCODONE 5 MG: 5 TABLET ORAL at 18:49

## 2021-03-23 RX ADMIN — ESCITALOPRAM OXALATE 10 MG: 10 TABLET ORAL at 09:12

## 2021-03-23 RX ADMIN — AMLODIPINE BESYLATE 5 MG: 5 TABLET ORAL at 20:23

## 2021-03-23 ASSESSMENT — PAIN - FUNCTIONAL ASSESSMENT: PAIN_FUNCTIONAL_ASSESSMENT: PREVENTS OR INTERFERES SOME ACTIVE ACTIVITIES AND ADLS

## 2021-03-23 ASSESSMENT — PAIN DESCRIPTION - LOCATION
LOCATION: WRIST
LOCATION: HIP

## 2021-03-23 ASSESSMENT — PAIN SCALES - GENERAL
PAINLEVEL_OUTOF10: 6
PAINLEVEL_OUTOF10: 7
PAINLEVEL_OUTOF10: 5
PAINLEVEL_OUTOF10: 5
PAINLEVEL_OUTOF10: 0
PAINLEVEL_OUTOF10: 7

## 2021-03-23 ASSESSMENT — PAIN DESCRIPTION - ORIENTATION
ORIENTATION: LEFT
ORIENTATION: LEFT

## 2021-03-23 ASSESSMENT — PAIN DESCRIPTION - PROGRESSION: CLINICAL_PROGRESSION: NOT CHANGED

## 2021-03-23 ASSESSMENT — PAIN DESCRIPTION - DESCRIPTORS: DESCRIPTORS: DISCOMFORT

## 2021-03-23 ASSESSMENT — PAIN DESCRIPTION - FREQUENCY: FREQUENCY: CONTINUOUS

## 2021-03-23 ASSESSMENT — PAIN DESCRIPTION - ONSET: ONSET: ON-GOING

## 2021-03-23 NOTE — CARE COORDINATION
CASE MANAGEMENT INITIAL ASSESSMENT      Reviewed chart and completed assessment with:patient  Explained Case Management role/services. Primary contact information:see below  Health Care Decision Maker :   Primary Decision Maker: Anusha Tobin - Spouse - [phone]      Can this person be reached and be able to respond quickly, such as within a few minutes or hours? Yes  Admit date/status:03/21/2021  Diagnosis:Closed fracture of neck of left femur   Is this a Readmission?:  No      Insurance:pending Medicaid   Precert required for SNF:y      3 night stay required: No    Living arrangements, Adls, care needs, prior to admission:Pt lives w/spouse in a ranch home w/2steps to enter. IPTA    Transportation:private     Durable Medical Equipment at home:  Walker_x_Cane__RTS__ BSC_x_Shower Chair_x_  02__ HHN__ CPAP__  BiPap__  Hospital Bed__ W/C_x__ Other__________    Services in the home and/or outpatient, prior to admission:none    PT/OT recs:home w/24 hr assist and HPT/OT    Hospital Exemption Notification (HEN):not initiated    Barriers to discharge:none    Plan/comments:pt plans to d/c home w/spouse who will drive pt. [facility] following for Joseph Ignacio orders. CM will continue to follow for needs.   Wisam Salas RN       [facility] on chart for MD signature

## 2021-03-23 NOTE — PROGRESS NOTES
Pt refusing telemetry at this time. Message to [address] [place] [address] [place], NP at [phone] on 3/23/21: Pt here for syncope and fractures. Pt is refusing telemetry, stating she doesn't feel like she needs it.  Thank you

## 2021-03-23 NOTE — PROGRESS NOTES
Physical Therapy    Facility/Department: [place] [address] - MED SURG/ORTHO  Initial Assessment/Treatment    NAME: Alejandro Nova  : 1960  MRN: [id number]    Date of Service: 3/23/2021    Discharge Recommendations:  24 hour supervision or assist, Home with Home health PT   PT Equipment Recommendations  Equipment Needed: Yes  Other: LUE Platform attachment for walker. Assessment   Body structures, Functions, Activity limitations: Decreased functional mobility ; Decreased ROM; Decreased strength;Decreased balance;Decreased endurance; Increased pain  Assessment: Pt is a 57year old previously independent female with PMH including HTN, DM who presented 3/21 after syncopal episode and collapse with L sided pain. Work up revealed LUE distal radius fracture, mildly displaced LLE intertrochanteric fracture and acute-subacute nondisplaced fractures of the 2nd-4th metatarsal heads. Pt is now s/p L gamma nail insertion LLE and ORIF LUE 3/22. Pt is WBAT with platform walker LUE and 50% weight bearing LLE. Upon evaluation, pt presents with above impairments requiring min-mod A x 2 for bed mobility and bed<>chair transfers. Pt will benefit from continued skilled PT while admitted to maximize functional independence and safety. Pt is highly motivated to return home with her 's assistance. Pt has 0 ALIZE from back entrance, owns a , W/C and BSC. Anticipate pt will be safe to return home with 's assistance and platform walker attachment after working with therapy for a few days. Recommend HHC at d/c. Will continue to follow. Treatment Diagnosis: Impaired mobility  Prognosis: Good  Decision Making: Medium Complexity  PT Education: Goals;PT Role;Plan of Care;Home Exercise Program;Precautions;Gait Training;General Safety;Transfer Training;Equipment;Weight-bearing Education; Functional Mobility Training;Disease Specific Education  Patient Education: Pt was educated regarding proper transfer technique in order to maintain various precautions - verbalized understanding, will benefit from reinforcement. REQUIRES PT FOLLOW UP: Yes  Activity Tolerance  Activity Tolerance: Patient Tolerated treatment well;Patient limited by pain  Activity Tolerance: Pain rating up to 6/10 with mobility. VSS throughout: BP supine 128/77, BP sitting 136/69. No c/o chest pain, SOB or dizziness. Patient Diagnosis(es): There were no encounter diagnoses. has a past medical history of Hypertension and Type II or unspecified type diabetes mellitus without mention of complication, not stated as uncontrolled. has no past surgical history on file. Restrictions  Restrictions/Precautions  Restrictions/Precautions: Weight Bearing, General Precautions, Fall Risk  Required Braces or Orthoses?: No  Lower Extremity Weight Bearing Restrictions  Left Lower Extremity Weight Bearing: Partial Weight Bearing  Partial Weight Bearing Percentage Or Pounds: 50 %  Upper Extremity Weight Bearing Restrictions  Left Upper Extremity Weight Bearing: Weight Bearing As Tolerated(On platform walker)  Position Activity Restriction  Other position/activity restrictions: IV, rodriguez catheter, no specific hip ROM precautions. acute-subacute nondisplaced 2-4th metatarsal head fx L foot. Vision/Hearing  Vision: Impaired  Vision Exceptions: Wears glasses for reading  Hearing: Within functional limits     Subjective  General  Chart Reviewed: Yes  Patient assessed for rehabilitation services?: Yes  Response To Previous Treatment: Not applicable  Family / Caregiver Present: No  Referring Practitioner: Arden Cruz MD  Referral Date : 03/22/21  Diagnosis: Closed intertrochanteric fx L femur  Follows Commands: Within Functional Limits  General Comment  Comments: RN clears for therapy  Subjective  Subjective: Pt lying in bed upon arrival, pleasant and agreeable to PT evaluation. Motivated to return home vs rehab.   Pain Screening  Patient Currently in Pain: Yes  Pain Assessment  Pain Assessment: 0-10  Pain Level: 5  Pain Type: Surgical pain  Pain Location: Hip  Pain Orientation: Left  Pain Descriptors: Discomfort  Pain Frequency: Continuous  Pain Onset: On-going  Clinical Progression: Not changed  Functional Pain Assessment: Prevents or interferes some active activities and ADLs  Non-Pharmaceutical Pain Intervention(s): Ambulation/Increased Activity; Distraction; Emotional support;Repositioned;Cold applied  Response to Pain Intervention: Patient Satisfied  Vital Signs  Pulse: 90  Heart Rate Source: Monitor  BP: 128/77  BP Location: Right upper arm  Patient Currently in Pain: Yes  Oxygen Therapy  SpO2: 95 %  O2 Device: None (Room air)  Pre Treatment Pain Screening  Intervention List: Patient able to continue with treatment;Nurse/physician notified(REcently medicated)    Orientation  Orientation  Overall Orientation Status: Within Normal Limits  Social/Functional History  Social/Functional History  Lives With: Spouse(on retired)  Type of Home: House  Home Layout: One level  Home Access: Level entry  Bathroom Shower/Tub: Walk-in shower  Bathroom Toilet: Standard  Bathroom Equipment: Grab bars in shower, 3-in-1 commode(vanity next to toilet)  Bathroom Accessibility: Walker accessible  Home Equipment: [employer], Rolling walker, Reacher, Long-handled shoehorn, Cane  ADL Assistance: Independent  Homemaking Responsibilities: Yes  Meal Prep Responsibility: Secondary  Laundry Responsibility: Secondary  Cleaning Responsibility: Secondary  Ambulation Assistance: Independent(without AD)  Transfer Assistance: Independent  Active : No  Occupation: (Homemaker)  Leisure & Hobbies: sitting on back porch to watch birds, crocheting, reading, [place]       Objective     Observation/Palpation  Observation: Bruising to L orbital region. AROM RLE (degrees)  RLE AROM: WFL  AROM LLE (degrees)  LLE AROM : Exceptions  LLE General AROM: Limited hip and knee ROM secondary to surgical pain.  Full Stair Climbing T-Scale Score : 35.66 (03/23/21 0959)  Mobility Inpatient CMS 0-100% Score: 67.36 (03/23/21 0959)  Mobility Inpatient without Stair CMS G-Code Modifier : CL (03/23/21 4803)       Goals  Short term goals  Time Frame for Short term goals: 1 week, 3/30/21  Short term goal 1: Pt will perform bed mobility with SBA  Short term goal 2: Pt will perform sit<>stand transfer with SBA  Short term goal 3: Pt will perform bed<>Chair transfer with platform walker and SBA  Short term goal 4: By 3/25, pt will tolerate x12-15 reps BLE exercise to assist with functional transfers and ambulation  Short term goal 5: Pt will ambulate 20ft with platform walker and min A while maintaining precautions  Patient Goals   Patient goals : \"To know my precautions and weight bearing status before I go home. \"       Therapy Time   Individual Concurrent Group Co-treatment   Time In 0810         Time Out 0859         Minutes 49         Timed Code Treatment Minutes: 39 Minutes(10 minute evaluation)       Sade Alejandro PT     If pt is unable to be seen after this session, please let this note serve as discharge summary. Please see case management note for discharge disposition. Thank you.

## 2021-03-23 NOTE — PROGRESS NOTES
Occupational Therapy   Occupational Therapy Initial Assessment  Date: 3/23/2021   Patient Name: Juan Escobedo  MRN: [id number]     : 1960    Date of Service: 3/23/2021    Discharge Recommendations:  24 hour supervision or assist, Home with Home health OT  OT Equipment Recommendations  Equipment Needed: Yes  Mobility Devices: ADL Assistive Devices  ADL Assistive Devices: Gait Belt    Assessment   Performance deficits / Impairments: Decreased functional mobility ; Decreased ADL status; Decreased balance  Assessment: pt normally independent with high level IADL's & functional mobility without AD; pt with fall & Left femoral neck fx & Left distal radial fx, s/p surgery; pt requiring mod assist of 2 with bed moblity & stand-pivot transfers; pt to benefit from skilled OT services  OT Education: OT Role;Plan of Care;Precautions  Patient Education: disease specific:  importance of OOB to chair for meals & short periods of time, use of Nurse call light for assist with transfers/ADL needs, & wt. bearing precautions  REQUIRES OT FOLLOW UP: Yes  Activity Tolerance  Activity Tolerance: Patient Tolerated treatment well  Activity Tolerance: high ordoñez's: BP = 128/77, HR = 89, 96 % O 2 sats on RA; sitting EOB:  BP = 134/88, HR = 91; sitting in chair:  BP = 136/69, HR = 90  Safety Devices  Safety Devices in place: Yes  Type of devices: Call light within reach; Chair alarm in place; Left in chair;Nurse notified           Patient Diagnosis(es): There were no encounter diagnoses. has a past medical history of Hypertension and Type II or unspecified type diabetes mellitus without mention of complication, not stated as uncontrolled. has no past surgical history on file.            Restrictions  Restrictions/Precautions  Restrictions/Precautions: Weight Bearing, General Precautions, Fall Risk  Required Braces or Orthoses?: No  Lower Extremity Weight Bearing Restrictions  Left Lower Extremity Weight Bearing: Partial Weight Bearing Partial Weight Bearing Percentage Or Pounds: 50 %  Upper Extremity Weight Bearing Restrictions  Left Upper Extremity Weight Bearing: Weight Bearing As Tolerated(On platform walker)  Position Activity Restriction  Other position/activity restrictions: IV, rodriguez catheter, no specific hip ROM precautions. acute-subacute nondisplaced 2-4th metatarsal head fx L foot. Subjective   General  Chart Reviewed: Yes  Patient assessed for rehabilitation services?: Yes  Family / Caregiver Present: No  Diagnosis: fall with Left femoral neck & distal radial fx; s/p IM nailing Left hip & Left wirst ORIF 3-22-21  Patient Currently in Pain: Yes  Pain Assessment  Pain Assessment: 0-10  Pain Level: 5  Pain Type: Surgical pain  Pain Location: Hip  Pain Orientation: Left  Pain Descriptors: Discomfort  Pain Frequency: Continuous  Pain Onset: On-going  Clinical Progression: Not changed  Functional Pain Assessment: Prevents or interferes some active activities and ADLs  Non-Pharmaceutical Pain Intervention(s): Ambulation/Increased Activity; Distraction; Emotional support;Repositioned;Cold applied  Response to Pain Intervention: Patient able to continue    Social/Functional History  Social/Functional History  Lives With: Spouse(on retired)  Type of Home: House  Home Layout: One level  Home Access: Level entry  Bathroom Shower/Tub: Walk-in shower  Bathroom Toilet: [address] [place] [address] [address]: Grab bars in shower, 3-in-1 commode(vanity next to toilet)  Bathroom Accessibility: Walker accessible  Home Equipment: [employer], Rolling walker, Reacher, Long-handled shoehorn, Cane  ADL Assistance: Independent  Homemaking Responsibilities: Yes  Meal Prep Responsibility: Secondary  Laundry Responsibility: Secondary  Cleaning Responsibility: Secondary  Ambulation Assistance: Independent(without AD)  Transfer Assistance: Independent  Active : No  Occupation: (Homemaker)  Leisure & Hobbies: sitting on back porch to watch birds, crocheting, reading, drives in the country       Objective   Vision: Impaired  Vision Exceptions: Wears glasses for reading  Hearing: Within functional limits    Orientation  Overall Orientation Status: Within Functional Limits  Observation/Palpation  Observation: Bruising to L orbital region.   Balance  Sitting Balance: Supervision  Standing Balance: Minimal assistance(static standing balance with LUE platform RW)  ADL  LE Dressing: Dependent/Total(non-skid socks)  Toileting: Dependent/Total(rodriguez catheter)    RUE Tone: Normotonic  LUE Tone: Normotonic  Coordination  Movements Are Fluid And Coordinated: No  Coordination and Movement description: Left UE     Bed mobility  Supine to Sit: 2 Person assistance(mod assist of 2 to Right)  Sit to Supine: Unable to assess(Left up in chair upon exiting, pt agreeable)  Scooting: Maximal assistance  Transfers  Stand Pivot Transfers: 2 Person assistance(mod assist of 2 with LUE platform RW  to RIght)  Sit to stand: Minimal assistance  Stand to sit: Minimal assistance  Vision - Basic Assessment  Prior Vision: Wears glasses only for reading  Cognition  Overall Cognitive Status: WFL        Sensation  Overall Sensation Status: WFL    Type of ROM/Therapeutic Exercise: AROM  Comment: LUE seated EOB   Hand flex/ext: x 10   Reps  Elbow flex/ext:  x   10 Reps  Shld flex/ext:  x 10   Reps      LUE General AROM: WFL except wrist & forearm due to fx/splint  RUE AROM : WFL     Plan   Plan  Times per week: 4-6x/ week  Current Treatment Recommendations: Strengthening, Balance Training, Functional Mobility Training, Safety Education & Training, Self-Care / ADL    AM-PAC Score        AM-[facility] Inpatient Daily Activity Raw Score: 12 (03/23/21 0949)  AM-PAC Inpatient ADL T-Scale Score : 30.6 (03/23/21 0949)  ADL Inpatient CMS 0-100% Score: 66.57 (03/23/21 0949)  ADL Inpatient CMS G-Code Modifier : CL (03/23/21 7053)    Goals  Short term goals  Time Frame for Short term goals: 3-5 days(3-28-21)  Short term goal 1: min assist of 1 stand-pivot transfers by 3-26-21  Short term goal 2: min assist with UE self care by 3-28-21  Short term goal 3: CGA static standing balance for ADL's at BSC/bedside  Patient Goals   Patient goals : home when able       Therapy Time   Individual Concurrent Group Co-treatment   Time In 0810         Time Out 0900         Minutes [address] [place], [place]

## 2021-03-23 NOTE — CONSULTS
[address] [facility]  [phone]      Attending Physician: Noé Salinas MD  Reason for Consultation/Chief Complaint: Syncope    Subjective   History of Present Illness:  Belenda Hammans is a 57 y.o. patient who presented to the hospital with complaints of fall after syncope leading to rt wrist and hip fracture. Pt states that has had 3 syncopal event over the last 10 years \"black out\" but these have been due to \"hyponatremia and hypoglycemia\" all per pt as Glucose <50 per pt. She is pleasant bu states that her PCP at [facility] has managed her for \"years\" and she does not want any further consult or testing, or heart monitor. I asked her if she would at least allow me to continue to talk to her about events and she said no but will allow PCP to make decision if she needs anything      Past Medical History:   has a past medical history of Hypertension and Type II or unspecified type diabetes mellitus without mention of complication, not stated as uncontrolled. Surgical History:   has no past surgical history on file. Social History:   reports that she has never smoked. She has never used smokeless tobacco. She reports that she does not drink alcohol. Family History:  family history is not on file. Home Medications:  Were reviewed and are listed in nursing record and/or below  Prior to Admission medications    Medication Sig Start Date End Date Taking? Authorizing Provider   atenolol (TENORMIN) 25 MG tablet TAKE ONE TABLET BY MOUTH DAILY 9/22/20  Yes Taurus Hernandez MD   gabapentin (NEURONTIN) 100 MG capsule TAKE ONE CAPSULE BY MOUTH TWICE A DAY 3/5/21 4/2/21  Taurus Hernandez MD   blood glucose test strips (ASCENSIA AUTODISC VI;ONE TOUCH ULTRA TEST VI) strip USE TO TEST DAILY. DX;E11.9 10/1/20   Taurus Hernandez MD   Blood Glucose Monitoring Suppl ([employer] BLOOD GLUCOSE KIT) KIT USE TO TEST DAILY.   DX:E11.9 10/1/20   Sally Jacque Gama MD   oxybutynin (DITROPAN) 5 MG tablet Take 1 tablet by mouth 3 times daily 9/22/20   Kuldeep Ugalde MD   escitalopram (LEXAPRO) 10 MG tablet Take 1 tablet by mouth daily 9/22/20   Kuldeep Ugalde MD   metFORMIN (GLUCOPHAGE) 1000 MG tablet TAKE ONE TABLET BY MOUTH TWICE A DAY 9/22/20   Kuldeep Ugalde MD   glyBURIDE (DIABETA) 5 MG tablet TAKE ONE TABLET BY MOUTH DAILY 9/22/20   Kuldeep Ugalde MD   amLODIPine (NORVASC) 5 MG tablet Take 1 tablet by mouth daily 9/22/20   Kuldeep Ugalde MD   famotidine (PEPCID) 20 MG tablet Take 1 tablet by mouth 2 times daily 9/22/20   Kuldeep Ugalde MD   raNITIdine (ZANTAC) 150 MG tablet TAKE ONE TABLET BY MOUTH TWICE A DAY 3/13/20   Kuldeep Ugalde, MD        CURRENT Medications:  insulin glargine (LANTUS) injection vial 18 Units, Nightly  morphine (PF) injection 2 mg, Q4H PRN  sodium chloride flush 0.9 % injection 10 mL, 2 times per day  sodium chloride flush 0.9 % injection 10 mL, PRN  sennosides-docusate sodium (SENOKOT-S) 8.6-50 MG tablet 1 tablet, BID  magnesium hydroxide (MILK OF MAGNESIA) 400 MG/5ML suspension 30 mL, Daily PRN  enoxaparin (LOVENOX) injection 40 mg, Daily  escitalopram (LEXAPRO) tablet 10 mg, Daily  famotidine (PEPCID) tablet 20 mg, BID  gabapentin (NEURONTIN) capsule 100 mg, BID  atenolol (TENORMIN) tablet 25 mg, Daily  amLODIPine (NORVASC) tablet 5 mg, Daily  insulin lispro (HUMALOG) injection vial 0-12 Units, TID WC  insulin lispro (HUMALOG) injection vial 0-6 Units, Nightly  glucose (GLUTOSE) 40 % oral gel 15 g, PRN  dextrose 50 % IV solution, PRN  glucagon (rDNA) injection 1 mg, PRN  dextrose 5 % solution, PRN  sodium chloride flush 0.9 % injection 10 mL, 2 times per day  sodium chloride flush 0.9 % injection 10 mL, PRN  potassium chloride (KLOR-CON M) extended release tablet 40 mEq, PRN    Or  potassium bicarb-citric acid (EFFER-K) effervescent tablet 40 mEq, PRN Or  potassium chloride 10 mEq/100 mL IVPB (Peripheral Line), PRN  magnesium sulfate 2000 mg in 50 mL IVPB premix, PRN  senna (SENOKOT) tablet 8.6 mg, Daily PRN  acetaminophen (TYLENOL) tablet 650 mg, Q6H PRN    Or  acetaminophen (TYLENOL) suppository 650 mg, Q6H PRN  ondansetron (ZOFRAN-ODT) disintegrating tablet 4 mg, Q8H PRN    Or  ondansetron (ZOFRAN) injection 4 mg, Q6H PRN  oxyCODONE (ROXICODONE) immediate release tablet 5 mg, Q4H PRN        Allergies:  Ace inhibitors     Review of Systems:   A 14 point review of symptoms completed. Pertinent positives identified in the HPI, all other review of symptoms negative as below.       Objective   PHYSICAL EXAM:    Vitals:    03/23/21 0908   BP: 114/69   Pulse: 84   Resp: 16   Temp: 98.5 °F (36.9 °C)   SpO2: 95%    Weight: 223 lb 15.8 oz (101.6 kg)        Exam refused by patient however    General Appearance:  Alert, cooperative, no distress, appears stated age, left eye eccymosis   Head:  Normocephalic, atraumatic   Eyes:  No scleral icterus, conjunctiva/corneas clear   Nose: Nares normal, no drainage or sinus tenderness   Throat: Lips and tongue normal   Neck: Supple, symmetrical, trachea midline, No obvious JVD   Lungs:   No tachypnea respirations unlabored, No obvious wheeze/stridor   Chest Wall:  No noticeable deformity   Heart:  Unable to assess visually   Abdomen:   No distension   Extremities: Extremities normal, atraumatic, no cyanosis or edema   Pulses: Unable to assess visually   Skin: Skin color, normal, no noticeable rashes or lesions   Pysch: Normal mood and affect   Neurologic: Normal gross motor         Labs   CBC:   Lab Results   Component Value Date    WBC 5.8 03/22/2021    RBC 3.76 03/22/2021    HGB 10.0 03/23/2021    HCT 28.9 03/23/2021    MCV 96.6 03/22/2021    RDW 14.0 03/22/2021     03/22/2021     CMP:  Lab Results   Component Value Date     03/23/2021    K 4.5 03/23/2021    K 3.8 03/22/2021    CL 90 03/23/2021    CO2 25 03/23/2021 patient's cardiac risk factors and adjusted pharmacologic treatment as needed. In addition, I have reinforced the need for patient directed risk factor modification. All questions and concerns were addressed to the patient/family. Alternatives to my treatment were discussed. The note was completed using EMR. Every effort was made to ensure accuracy; however, inadvertent computerized transcription errors may be present.

## 2021-03-23 NOTE — PROGRESS NOTES
Department of Orthopedic Surgery  Physician Assistant   Progress Note    Subjective:       Systemic or Specific Complaints:No Complaints per patient today. She is feeling much improved today compared to pre op. She was able to participate with therapy and is sitting in the chair. Objective:     Patient Vitals for the past 24 hrs:   BP Temp Temp src Pulse Resp SpO2 Height Weight   03/23/21 0908 114/69 98.5 °F (36.9 °C) Oral 84 16 95 %     03/23/21 0830 128/77   90  95 %     03/23/21 0316 114/69 98 °F (36.7 °C)  80 12 95 %  223 lb 15.8 oz (101.6 kg)   03/23/21 0015       5' 2\" (1.575 m)    03/22/21 2356 100/67 98 °F (36.7 °C) Oral 79 12 92 %     03/22/21 2051 107/68 98.2 °F (36.8 °C)  82 16 92 %     03/22/21 1727 112/73 97.7 °F (36.5 °C) Oral 78 16 92 %     03/22/21 1711    76 14 100 %     03/22/21 1706    73 15 99 %     03/22/21 1701 122/75   76 10 99 %     03/22/21 1656    79 14 100 %     03/22/21 1651 125/70   75 14 100 %     03/22/21 1646 (!) 143/71   74 14 100 %     03/22/21 1641 119/76   73 16 100 %     03/22/21 1636 135/80   72 10 100 %     03/22/21 1631 128/72   73 10 100 %     03/22/21 1626 134/60   72 11 100 %     03/22/21 1621 112/61   73 13 100 %     03/22/21 1616 111/63   72 18 100 %     03/22/21 1611 (!) 124/95   74 13 100 %     03/22/21 1606 (!) 111/59   77 19 98 %     03/22/21 1601 (!) 123/57 96.5 °F (35.8 °C) Temporal 73 18 98 %         General: alert, appears stated age, cooperative and no distress   Wound: Wound clean and dry no evidence of infection. Motion: Painful range of Motion in affected extremity   DVT Exam: No evidence of DVT seen on physical exam.     Additional exam: NVI to left UE and LE  Splint in place to left wrist.  Able to move fingers and thumb without any issues  Mild bloody drainage to mepilex on left hip. Hip swollen but soft. Moving toes and ankle without difficulty.      Data Review

## 2021-03-23 NOTE — PROGRESS NOTES
Hospitalist Progress Note      PCP: Tyler Angeles MD    Date of Admission: 3/21/2021    Hospital Course: \"57 y.o. female presented to [facility]for left-sided hip, wrist, and foot pain s/p syncope and collapse 3 days PTA. She had syncopal episode associated with dizziness. The patient admits that she drinks approximately 4+ liters of water daily despite recommendations for fluid restriction by physicians. She was initially evaluated [facility] at which time she was diagnosed with left wrist fracture but told that she had no evidence of hip fracture. She was then reevaluated at [facility] ED at which time she does have minimally displaced intertrochanteric fracture of her left hip. Labs were obtained and notable for moderate hyponatremia at 125, thrombocytopenia, and acute hyperglycemia due to uncontrolled diabetes. She was transferred to [facility] for anticipated surgical intervention by orthopedics team.\"       Subjective:   No complaints. Patient walked with PT. She feels better.     Medications:  Reviewed    Infusion Medications    dextrose       Scheduled Medications    insulin glargine  18 Units Subcutaneous Nightly    sodium chloride flush  10 mL Intravenous 2 times per day    sennosides-docusate sodium  1 tablet Oral BID    enoxaparin  40 mg Subcutaneous Daily    escitalopram  10 mg Oral Daily    famotidine  20 mg Oral BID    gabapentin  100 mg Oral BID    atenolol  25 mg Oral Daily    amLODIPine  5 mg Oral Daily    insulin lispro  0-12 Units Subcutaneous TID WC    insulin lispro  0-6 Units Subcutaneous Nightly    sodium chloride flush  10 mL Intravenous 2 times per day     PRN Meds: morphine, sodium chloride flush, magnesium hydroxide, glucose, dextrose, glucagon (rDNA), dextrose, sodium chloride flush, potassium chloride **OR** potassium alternative oral replacement **OR** potassium chloride, magnesium sulfate, senna, acetaminophen **OR** acetaminophen, ondansetron **OR** ondansetron, oxyCODONE      Intake/Output Summary (Last 24 hours) at 3/23/2021 1435  Last data filed at 3/23/2021 0708  Gross per 24 hour   Intake    Output 2550 ml   Net -2550 ml       Physical Exam Performed:    /62   Pulse 75   Temp 99 °F (37.2 °C) (Oral)   Resp 16   Ht 5' 2\" (1.575 m)   Wt 223 lb 15.8 oz (101.6 kg)   SpO2 98%   BMI 40.97 kg/m²     General appearance: No apparent distress, appears stated age and cooperative. HEENT: Pupils equal, round, and reactive to light. Conjunctivae/corneas clear. Neck: Supple, with full range of motion. No jugular venous distention. Trachea midline. Respiratory:  Normal respiratory effort. Clear to auscultation, bilaterally without Rales/Wheezes/Rhonchi. Cardiovascular: Regular rate and rhythm with normal S1/S2 without murmurs, rubs or gallops. Abdomen: Soft, non-tender, non-distended with normal bowel sounds. Musculoskeletal: No clubbing, cyanosis or edema bilaterally. Left hip dressing and wrist dressing. Skin: Skin color, texture, turgor normal.  No rashes or lesions. Neurologic:  Neurovascularly intact without any focal sensory/motor deficits. Cranial nerves: II-XII intact, grossly non-focal.  Psychiatric: Alert and oriented, thought content appropriate, normal insight      Labs:   Recent Labs     03/21/21  1420 03/22/21  0529 03/23/21  0602   WBC 6.1 5.8  --    HGB 13.8 12.3 10.0*   HCT 40.1 36.3 28.9*   * 108*  --      Recent Labs     03/21/21  1420 03/22/21  0529 03/23/21  0602   * 130* 125*   K 3.9 3.8 4.5   CL 87* 91* 90*   CO2 28 38* 25   BUN 6* 8 9   CREATININE 0.6 <0.5* <0.5*   CALCIUM 10.2 10.1 9.3     Recent Labs     03/21/21  1420 03/22/21  0529   AST 29 24   ALT 16 12   BILITOT 1.5* 1.4*   ALKPHOS 92 78     Recent Labs     03/21/21  1420   INR 1.23*     No results for input(s): Brookeophelia Umanzor in the last 72 hours.     Urinalysis:    No results found for: NITRU, [address], BACTERIA, RBCUA, [place] Maldon[place] [facility]    Radiology:  XR FEMUR LEFT (MIN 2 VIEWS)   Final Result   Intraoperative spot films as above         XR CHEST PORTABLE   Final Result   Clear lungs. Cardiomegaly. No acute cardiopulmonary abnormality. Assessment/Plan:    Active Hospital Problems    Diagnosis    Closed displaced intertrochanteric fracture of left femur ([address] [S72.142A]    Retained orthopedic hardware [Z96.9]    Closed fracture of neck of left femur ([address] [S72.002A]    Syncope [R55]    Hyponatremia [E87.1]    Polydipsia [R63.1]    Acute hyperglycemia [R73.9]    DM (diabetes mellitus), type 2, uncontrolled ([address] [E11.65]    Closed fracture of distal end of left radius [S52.502A]    Thrombocytopenia (HCC) [D69.6]    Peripheral neuropathy [G62.9]    Essential hypertension [I10]        Left femoral neck fracture/left distal radius fracture  S/p left wrist ORIF and left hip nail on 3/22/21  -Pain control  -PT/OT  -Regimen as per orthopedic surgery     Syncope and collapse-possibly due to hyponatremia  Fall precautions in place  FU echocardiogram.She had Cardiology consultation but declined and would like to talk to PCP on discharge.  -Telemetry     Hyponatremia-due to hypovolemia  Continue IV hydration  Nephrology following     Uncontrolled type II DM with acute hyperglycemia  A1c 5.5  Continue insulin coverage     Periorbital hematoma status post head contusion/trauma  Patient reports head CT performed at initial evaluation and noted to be negative for acute abnormality  Fall parameters in place    DVT Prophylaxis: Lovenox   Diet: DIET GENERAL; Carb Control: 4 carb choices (60 gms)/meal; No Added Salt (3-4 GM); Daily Fluid Restriction: 1800 ml  Dietary Nutrition Supplements: Standard High Calorie Oral Supplement  Code Status: Full Code    PT/OT Eval Status: Ordered    Dispo -DC home with home health per patient's wishes when okay with orthopedic surgery and Echo is completed. Dorothy Cook MD

## 2021-03-23 NOTE — PROGRESS NOTES
Pt assessment completed and charted. VSS. Pt a/o. PRN medication administered per MAR. Payam patent. Bed in lowest position and wheels locked. Call light within reach. Bedside table within reach. Non-skid footwear in place. Pt denies any other needs at this time. Pt calls out appropriately. Will continue to monitor.

## 2021-03-23 NOTE — PLAN OF CARE
Problem: Pain:  Goal: Pain level will decrease  Description: Pain level will decrease  Outcome: Ongoing     Problem: Falls - Risk of:  Goal: Will remain free from falls  Description: Will remain free from falls  Outcome: Met This Shift

## 2021-03-24 ENCOUNTER — TELEPHONE (OUTPATIENT)
Dept: ORTHOPEDIC SURGERY | Age: 61
End: 2021-03-24

## 2021-03-24 LAB
ANION GAP SERPL CALCULATED.3IONS-SCNC: 8 MMOL/L (ref 3–16)
BUN BLDV-MCNC: 7 MG/DL (ref 7–20)
CALCIUM SERPL-MCNC: 8.8 MG/DL (ref 8.3–10.6)
CHLORIDE BLD-SCNC: 93 MMOL/L (ref 99–110)
CO2: 27 MMOL/L (ref 21–32)
CREAT SERPL-MCNC: <0.5 MG/DL (ref 0.6–1.2)
GFR AFRICAN AMERICAN: >60
GFR NON-AFRICAN AMERICAN: >60
GLUCOSE BLD-MCNC: 143 MG/DL (ref 70–99)
GLUCOSE BLD-MCNC: 207 MG/DL (ref 70–99)
GLUCOSE BLD-MCNC: 228 MG/DL (ref 70–99)
GLUCOSE BLD-MCNC: 229 MG/DL (ref 70–99)
GLUCOSE BLD-MCNC: 277 MG/DL (ref 70–99)
HCT VFR BLD CALC: 27.2 % (ref 36–48)
HEMOGLOBIN: 9.2 G/DL (ref 12–16)
LV EF: 58 %
LVEF MODALITY: NORMAL
MCH RBC QN AUTO: 33.1 PG (ref 26–34)
MCHC RBC AUTO-ENTMCNC: 33.8 G/DL (ref 31–36)
MCV RBC AUTO: 97.9 FL (ref 80–100)
PDW BLD-RTO: 13.7 % (ref 12.4–15.4)
PERFORMED ON: ABNORMAL
PLATELET # BLD: 109 K/UL (ref 135–450)
PMV BLD AUTO: 8.6 FL (ref 5–10.5)
POTASSIUM SERPL-SCNC: 4 MMOL/L (ref 3.5–5.1)
RBC # BLD: 2.78 M/UL (ref 4–5.2)
SODIUM BLD-SCNC: 128 MMOL/L (ref 136–145)
WBC # BLD: 5.7 K/UL (ref 4–11)

## 2021-03-24 PROCEDURE — 6370000000 HC RX 637 (ALT 250 FOR IP): Performed by: ORTHOPAEDIC SURGERY

## 2021-03-24 PROCEDURE — 97530 THERAPEUTIC ACTIVITIES: CPT

## 2021-03-24 PROCEDURE — 97116 GAIT TRAINING THERAPY: CPT

## 2021-03-24 PROCEDURE — APPNB30 APP NON BILLABLE TIME 0-30 MINS: Performed by: PHYSICIAN ASSISTANT

## 2021-03-24 PROCEDURE — 36415 COLL VENOUS BLD VENIPUNCTURE: CPT

## 2021-03-24 PROCEDURE — 97110 THERAPEUTIC EXERCISES: CPT

## 2021-03-24 PROCEDURE — 93306 TTE W/DOPPLER COMPLETE: CPT

## 2021-03-24 PROCEDURE — 85027 COMPLETE CBC AUTOMATED: CPT

## 2021-03-24 PROCEDURE — 6370000000 HC RX 637 (ALT 250 FOR IP): Performed by: INTERNAL MEDICINE

## 2021-03-24 PROCEDURE — 80048 BASIC METABOLIC PNL TOTAL CA: CPT

## 2021-03-24 PROCEDURE — 2580000003 HC RX 258: Performed by: INTERNAL MEDICINE

## 2021-03-24 PROCEDURE — 6370000000 HC RX 637 (ALT 250 FOR IP): Performed by: PHYSICIAN ASSISTANT

## 2021-03-24 PROCEDURE — 1200000000 HC SEMI PRIVATE

## 2021-03-24 PROCEDURE — 6360000002 HC RX W HCPCS: Performed by: ORTHOPAEDIC SURGERY

## 2021-03-24 RX ORDER — OXYCODONE HYDROCHLORIDE 5 MG/1
5 TABLET ORAL EVERY 4 HOURS PRN
Status: DISCONTINUED | OUTPATIENT
Start: 2021-03-24 | End: 2021-03-27 | Stop reason: HOSPADM

## 2021-03-24 RX ORDER — OXYCODONE HYDROCHLORIDE 5 MG/1
5 TABLET ORAL EVERY 6 HOURS PRN
Qty: 28 TABLET | Refills: 0 | Status: SHIPPED | OUTPATIENT
Start: 2021-03-24 | End: 2021-03-31

## 2021-03-24 RX ORDER — INSULIN GLARGINE 100 [IU]/ML
22 INJECTION, SOLUTION SUBCUTANEOUS NIGHTLY
Status: DISCONTINUED | OUTPATIENT
Start: 2021-03-24 | End: 2021-03-25

## 2021-03-24 RX ORDER — SODIUM CHLORIDE 9 MG/ML
INJECTION, SOLUTION INTRAVENOUS CONTINUOUS
Status: DISCONTINUED | OUTPATIENT
Start: 2021-03-24 | End: 2021-03-25

## 2021-03-24 RX ADMIN — FAMOTIDINE 20 MG: 20 TABLET ORAL at 19:48

## 2021-03-24 RX ADMIN — GABAPENTIN 100 MG: 100 CAPSULE ORAL at 19:49

## 2021-03-24 RX ADMIN — INSULIN GLARGINE 22 UNITS: 100 INJECTION, SOLUTION SUBCUTANEOUS at 21:42

## 2021-03-24 RX ADMIN — ENOXAPARIN SODIUM 40 MG: 40 INJECTION SUBCUTANEOUS at 09:07

## 2021-03-24 RX ADMIN — INSULIN LISPRO 4 UNITS: 100 INJECTION, SOLUTION INTRAVENOUS; SUBCUTANEOUS at 17:16

## 2021-03-24 RX ADMIN — DOCUSATE SODIUM 50 MG AND SENNOSIDES 8.6 MG 1 TABLET: 8.6; 5 TABLET, FILM COATED ORAL at 19:49

## 2021-03-24 RX ADMIN — MORPHINE SULFATE 2 MG: 2 INJECTION, SOLUTION INTRAMUSCULAR; INTRAVENOUS at 09:21

## 2021-03-24 RX ADMIN — OXYCODONE HYDROCHLORIDE 5 MG: 5 TABLET ORAL at 19:49

## 2021-03-24 RX ADMIN — DOCUSATE SODIUM 50 MG AND SENNOSIDES 8.6 MG 1 TABLET: 8.6; 5 TABLET, FILM COATED ORAL at 09:07

## 2021-03-24 RX ADMIN — ESCITALOPRAM OXALATE 10 MG: 10 TABLET ORAL at 09:07

## 2021-03-24 RX ADMIN — FAMOTIDINE 20 MG: 20 TABLET ORAL at 09:07

## 2021-03-24 RX ADMIN — INSULIN LISPRO 2 UNITS: 100 INJECTION, SOLUTION INTRAVENOUS; SUBCUTANEOUS at 21:38

## 2021-03-24 RX ADMIN — AMLODIPINE BESYLATE 5 MG: 5 TABLET ORAL at 19:48

## 2021-03-24 RX ADMIN — INSULIN LISPRO 4 UNITS: 100 INJECTION, SOLUTION INTRAVENOUS; SUBCUTANEOUS at 12:05

## 2021-03-24 RX ADMIN — GABAPENTIN 100 MG: 100 CAPSULE ORAL at 09:07

## 2021-03-24 RX ADMIN — SODIUM CHLORIDE: 9 INJECTION, SOLUTION INTRAVENOUS at 17:16

## 2021-03-24 RX ADMIN — ATENOLOL 25 MG: 25 TABLET ORAL at 09:07

## 2021-03-24 ASSESSMENT — PAIN DESCRIPTION - LOCATION
LOCATION: WRIST
LOCATION: WRIST

## 2021-03-24 ASSESSMENT — PAIN SCALES - GENERAL
PAINLEVEL_OUTOF10: 3
PAINLEVEL_OUTOF10: 3
PAINLEVEL_OUTOF10: 5

## 2021-03-24 ASSESSMENT — PAIN DESCRIPTION - ORIENTATION: ORIENTATION: LEFT

## 2021-03-24 NOTE — PROGRESS NOTES
Hospitalist Progress Note      PCP: Nicolette Roth MD    Date of Admission: 3/21/2021    Hospital Course: \"57 y.o. female presented to [facility] EDfor left-sided hip, wrist, and foot pain s/p syncope and collapse 3 days PTA. She had syncopal episode associated with dizziness. The patient admits that she drinks approximately 4+ liters of water daily despite recommendations for fluid restriction by physicians. She was initially evaluated [facility] at which time she was diagnosed with left wrist fracture but told that she had no evidence of hip fracture. She was then reevaluated at [facility] ED at which time she does have minimally displaced intertrochanteric fracture of her left hip. Labs were obtained and notable for moderate hyponatremia at 125, thrombocytopenia, and acute hyperglycemia due to uncontrolled diabetes. She was transferred to [facility] for anticipated surgical intervention by orthopedics team.\"       Subjective:   No complaints. Pain controlled.     Medications:  Reviewed    Infusion Medications    dextrose       Scheduled Medications    insulin glargine  18 Units Subcutaneous Nightly    sodium chloride flush  10 mL Intravenous 2 times per day    sennosides-docusate sodium  1 tablet Oral BID    enoxaparin  40 mg Subcutaneous Daily    escitalopram  10 mg Oral Daily    famotidine  20 mg Oral BID    gabapentin  100 mg Oral BID    atenolol  25 mg Oral Daily    amLODIPine  5 mg Oral Daily    insulin lispro  0-12 Units Subcutaneous TID WC    insulin lispro  0-6 Units Subcutaneous Nightly    sodium chloride flush  10 mL Intravenous 2 times per day     PRN Meds: oxyCODONE, morphine, sodium chloride flush, magnesium hydroxide, glucose, dextrose, glucagon (rDNA), dextrose, sodium chloride flush, potassium chloride **OR** potassium alternative oral replacement **OR** potassium chloride, magnesium sulfate, senna, acetaminophen **OR** acetaminophen, ondansetron **OR** ondansetron      Intake/Output Summary (Last 24 hours) at 3/24/2021 1604  Last data filed at 3/24/2021 1422  Gross per 24 hour   Intake 120 ml   Output 4800 ml   Net -4680 ml       Physical Exam Performed:    /72   Pulse 73   Temp 98.5 °F (36.9 °C) (Oral)   Resp 18   Ht 5' 2\" (1.575 m)   Wt 218 lb 4.1 oz (99 kg)   SpO2 99%   BMI 39.92 kg/m²     General appearance: No apparent distress, appears stated age and cooperative. HEENT: Pupils equal, round, and reactive to light. Conjunctivae/corneas clear. Neck: Supple, with full range of motion. No jugular venous distention. Trachea midline. Respiratory:  Normal respiratory effort. Clear to auscultation, bilaterally without Rales/Wheezes/Rhonchi. Cardiovascular: Regular rate and rhythm with normal S1/S2 without murmurs, rubs or gallops. Abdomen: Soft, non-tender, non-distended with normal bowel sounds. Musculoskeletal: No clubbing, cyanosis or edema bilaterally. Left hip dressing and wrist dressing. Skin: Skin color, texture, turgor normal.  No rashes or lesions. Neurologic:  Neurovascularly intact without any focal sensory/motor deficits. Cranial nerves: II-XII intact, grossly non-focal.  Psychiatric: Alert and oriented, thought content appropriate, normal insight      Labs:   Recent Labs     03/22/21  0529 03/23/21  0602 03/24/21  0936   WBC 5.8  --  5.7   HGB 12.3 10.0* 9.2*   HCT 36.3 28.9* 27.2*   *  --  109*     Recent Labs     03/22/21  0529 03/23/21  0602 03/24/21  0936   * 125* 128*   K 3.8 4.5 4.0   CL 91* 90* 93*   CO2 38* 25 27   BUN 8 9 7   CREATININE <0.5* <0.5* <0.5*   CALCIUM 10.1 9.3 8.8     Recent Labs     03/22/21  0529   AST 24   ALT 12   BILITOT 1.4*   ALKPHOS 78     No results for input(s): INR in the last 72 hours. No results for input(s): Tamy Yee in the last 72 hours.     Urinalysis:    No results found for: Marla Garvey, BACTERIA, RBCUA, BLOODU, [address], [place] Gaby [address]    Radiology:  XR FEMUR LEFT (MIN 2 VIEWS)   Final Result   Intraoperative spot films as above         XR CHEST PORTABLE   Final Result   Clear lungs. Cardiomegaly. No acute cardiopulmonary abnormality. Assessment/Plan:    Active Hospital Problems    Diagnosis    Closed displaced intertrochanteric fracture of left femur ([facility] [S72.142A]    Retained orthopedic hardware [Z96.9]    Closed fracture of neck of left femur ([facility] [S72.002A]    Syncope [R55]    Hyponatremia [E87.1]    Polydipsia [R63.1]    Acute hyperglycemia [R73.9]    DM (diabetes mellitus), type 2, uncontrolled ([facility] [E11.65]    Closed fracture of distal end of left radius [S52.502A]    Thrombocytopenia (HCC) [D69.6]    Peripheral neuropathy [G62.9]    Essential hypertension [I10]        Left femoral neck fracture/left distal radius fracture  S/p left wrist ORIF and left hip nail on 3/22/21  -Pain control  -PT/OT  -Management as per orthopedic surgery     Syncope and collapse-possibly due to hyponatremia  Fall precautions in place  Echo shows EF 55-60%,mild LVH,grade 2 diastolic dysfunction,Moderate Pulm HTN. She had Cardiology consultation but declined and would like to talk to PCP on discharge.  -Telemetry    Left eye hematoma  -Conservative management     Hyponatremia-due to hypovolemia. Urine studies consistent with low volume  Sodium improved slowly  Restart IV hydration  Nephrology following     Uncontrolled type II DM with acute hyperglycemia  A1c 5.5  Glucose still high. Increase lantus to 22 units qhs and continue medium coverage scale     Periorbital hematoma status post head contusion/trauma  Patient reports head CT performed at initial evaluation and noted to be negative for acute abnormality  Fall parameters in place    DVT Prophylaxis: Lovenox   Diet: DIET GENERAL; Carb Control: 4 carb choices (60 gms)/meal; No Added Salt (3-4 GM);  Daily Fluid Restriction: 1800 ml  Dietary Nutrition Supplements: Standard High Calorie Oral Supplement  Code Status: Full Code    PT/OT Eval Status: Ordered    Dispo -DC home with home health. Equipment arriving tomorrow. Await sodium to improve. Pt now has pending medicaid number    Willi Limon MD

## 2021-03-24 NOTE — OP NOTE
[facility] [place] [address]                                OPERATIVE REPORT    PATIENT NAME: Tripp Ramos                       :        1960  MED REC NO:   [id number]                          ROOM:       0522  ACCOUNT NO:   [de-identified]                           ADMIT DATE: 2021  PROVIDER:     Court Goldsmith MD    DATE OF PROCEDURE:  2021    PRIMARY CARE PHYSICIAN:  Leda Tuttle MD    SURGEON:  Court Goldsmith MD    ASSISTANTS:  Robi Pena, Surgical Assistants. PREOPERATIVE DIAGNOSES:  1. Left femur intertrochanteric displaced periprosthetic fracture with  the distal femur plate. 2.  Left distal radius three-part intraarticular displaced fracture. 3.  Broken proximal screw previous plate. POSTOPERATIVE DIAGNOSES:  1. Left femur intertrochanteric displaced periprosthetic fracture with  the distal femur plate. 2.  Left distal radius three-part intraarticular displaced fracture. 3.  Broken proximal screw previous plate. PROCEDURES DONE:  1. Open treatment of left femur intertrochanteric fracture with a  locked short Gamma nail. 2.  Open treatment of left distal radius three-part intraarticular  fracture, open reduction and internal fixation. 3.  Removal of broken screw/deep implant. ANESTHESIA:  General anesthesia. ESTIMATED BLOOD LOSS:  Minimal.    COMPLICATIONS:  None. IMPLANTS USED:  1.  Trail short Gamma nail size 10 x 170 with 105 mm hip screw and one  distal locking screw. 2.  Trail Titanium intermediate volar locking plate with a total of  seven distal 2.7 locking screws and two proximal screws. INDICATIONS:  This is a 59-year-old white female, who had the previous  surgery in 2020 and also had sustained left distal femur  fracture, was treated with a long locking plate. The patient sustained  a fall on Friday with a left wrist as well as hip pain.   She was seen in  an outside facility in [facility], where she had an x-ray of the  femur as well as the wrist and diagnosed with distal radius fracture,  but did not see the hip. She continued to have hip pain and while at  home, she felt a pop with significant pain in the left hip. The patient  was brought to [facility].  She was found to have a  displaced intertrochanteric fracture along with the distal radius  fracture. The patient was then transferred to [facility] for the  operating table that was not available in the [facility]. All risks,  benefits, and alternatives were discussed with the patient and she  agreed to proceed with the surgical treatment. Given the patient's Body mass index is 39.92 kg/m². and a broken screw added significant challenge to the procedure. It required significant physical and mental effort. It required 200% more time for such procedure. DESCRIPTION OF THE PROCEDURE:  The patient's left hip and wrist were  marked. She received 2 gm Ancef IV preoperatively. The patient was  then brought to the operating room, underwent general anesthesia. She  was then secured in a [place] table. The left hip and lower extremity were  then prepped and draped in a regular sterile routine fashion after  confirming fracture in good position. A small incision was made  proximal to the greater trochanter and we used the awl to create the  entry point. It was confirmed with a C-arm to be in good position. We  then passed the guidewire distally. We then used the 15 mm opening  reamer to open the entry point. The guidewire was then passed and we  now passed the proximal screw. At this point, we made an incision over the proximal femur to access the  proximal part of the plate. We were carefully able to expose the  various proximal screw and was found to be broken at the head.   It was  significantly challenging, physically and mentally, very difficult given  that the screw was broken to the plate as well as was in the way we were  not able to access the canal.  We then used the broken screw removal  set, the first one did not work. I had to call for second one. Then  after multiple attempts, we were able to access the screw and then it  was removed. We then also removed the one just distal to it. At this  point, we were able to pass the guidewire distally and we elected to  proceed with the short Gamma nail size 10 x 170. The Gamma nail was  then passed over the guidewire and then we were able to put the pin for  the hip screw in the center part of the head and neck. The 105 screw  was placed, which achieved good compression across the fracture. We  then used the aiming arm and we placed with one distal locking screw. C-arm confirmed the anatomic reduction and good position of the Gamma  nail as well as the distal locking screw. At this point,  instrumentation was removed. We irrigated the incision copiously with  normal saline mixed with gentamicin. We closed the fascia jacob with an  0 Vicryl, subcutaneous with the 2-0 and 3-0 Vicryl, and the skin with  the staples. Dressing was then applied in the form of Xeroform, 4x4,  and tape. At this point, we turned our attention towards the distal radius. The  patient was kept on the [place] table and then an armboard was used. We  applied a tourniquet in the left upper arm. The left upper extremity  was then prepped and draped in a sterile routine fashion. A time-out  was called, confirmed the patient's name and site of the procedure. An Esmarch was used for exsanguination. The tourniquet was inflated to  250 mmHg. A volar approach was performed. The incision was made over  the FCR tendon. The tendon sheath was opened. At this point, we  incised the pronator quadratus muscle with the cautery. We exposed and  the fracture was noted to be a three-part intraarticular fracture. We  were able to reduce it anatomically. While maintaining the reduction,  we used a Ana Titanium volar locking plate, which was placed in  appropriate position. We secured it with a K-wire distally. We then  put one screw in the oblong hole and then reduced the fracture to the  plate and locked it with a total of seven distal 2.7 locking screws, we  added one more locking screw in the shaft. Overall, we were very  satisfied with the anatomic reduction and position of all of the screws. At this point, we let the tourniquet down. Hemostasis was secured. We  irrigated the incision copiously with normal saline mixed with  gentamicin. We then closed the subcutaneous with a 3-0 Vicryl and the  skin with a 4-0 Monocryl. The Steri-Strips were then applied. Dressing  was then applied with Xeroform, 4x4s, sterile Webril, and a volar splint  was applied. The patient tolerated the procedure well and was taken to the recovery  in stable condition. POSTOPERATIVE PLAN:  The patient will be readmitted again as an  inpatient. We will start PT/OT with 50% weightbearing only in the left  lower extremity and weightbearing as tolerated in the elbow using a  platform walker for six weeks. The patient will start range of motion  of the wrist in two weeks. The patient will need rehab placement.         Ree Castillo MD    D: 03/23/2021 8:11:49       T: 03/23/2021 15:10:34     [id number]  Job#: [id number]     Doc#: [id number]    CC:  Peter Medel MD

## 2021-03-24 NOTE — PROGRESS NOTES
Physical Therapy  Facility/Department: [facility] C5 - MED SURG/ORTHO  Daily Treatment Note  NAME: Alejandro Nova  : 1960  MRN: [id number]    Date of Service: 3/24/2021    Discharge Recommendations:  24 hour supervision or assist, Home with Home health PT   PT Equipment Recommendations  Equipment Needed: Yes  Other: LUE Platform walker. Assessment   Body structures, Functions, Activity limitations: Decreased functional mobility ; Decreased ROM; Decreased strength;Decreased balance;Decreased endurance; Increased pain  Assessment: Pt porgressing nicely with PT today needing CGA/min for all transfers and ambulation 6', 10'X 2 using platform walker and observing 50% LLE. Pt is reocmmended for home D/.C with 24 hr A form spouse and HHPT. Treatment Diagnosis: Impaired mobility  Prognosis: Good  Decision Making: Medium Complexity  PT Education: Goals;PT Role;Plan of Care;Home Exercise Program;Precautions;Gait Training;General Safety;Transfer Training;Equipment;Weight-bearing Education; Functional Mobility Training;Disease Specific Education  Patient Education: Pt was educated regarding proper transfer technique in order to maintain various precautions - verbalized understanding, will benefit from reinforcement. Activity Tolerance  Activity Tolerance: Patient Tolerated treatment well;Patient limited by pain  Activity Tolerance: vitals: 126/76, HR 93 and O2 94%     Patient Diagnosis(es): The encounter diagnosis was Closed displaced intertrochanteric fracture of left femur, initial encounter ([facility] [address] has a past medical history of Hypertension and Type II or unspecified type diabetes mellitus without mention of complication, not stated as uncontrolled. has a past surgical history that includes Femur fracture surgery (Left, 3/22/2021) and Wrist fracture surgery (Left, 3/22/2021).     Restrictions  Restrictions/Precautions  Restrictions/Precautions: Weight Bearing, General Precautions, Fall Risk  Required Braces or Orthoses?: No  Lower Extremity Weight Bearing Restrictions  Left Lower Extremity Weight Bearing: Partial Weight Bearing  Partial Weight Bearing Percentage Or Pounds: 50 %  Upper Extremity Weight Bearing Restrictions  Left Upper Extremity Weight Bearing: Weight Bearing As Tolerated(on platform walker)  Position Activity Restriction  Other position/activity restrictions: \"50% weightbearing only in the leftlower extremity and weightbearing as tolerated in the elbow using aplatform walker for six weeks. The patient will start range of motion of the wrist in two weeks. \" Per Dr Mark Cruz note 3/23/21     Subjective   General  Chart Reviewed: Yes  Response To Previous Treatment: Patient with no complaints from previous session. Family / Caregiver Present: Yes(spouse for 1/2 session)  Referring Practitioner: Kim Duenas MD  Subjective  Subjective: Pt lying in bed upon arrival, pleasant and agreeable to PT. Motivated to return home vs rehab. General Comment  Comments: RN clears for therapy  Pain Screening  Patient Currently in Pain: Yes  Pain Assessment  Pain Assessment: 0-10  Pain Level: 3  Pain Location: Wrist  Pain Orientation: Left  Vital Signs  Patient Currently in Pain: Yes       Orientation  Orientation  Overall Orientation Status: Within Normal Limits     Objective   Bed mobility  Supine to Sit: Minimal assistance  Sit to Supine: Unable to assess  Scooting: Stand by assistance(to scoot to EOB once seated)  Transfers  Sit to Stand: Contact guard assistance;Minimal Assistance(more assist needed intially but  decreased to CGA with practice)  Stand to sit: Contact guard assistance  Bed to Chair: Minimal assistance(Pt needed cues for hand placement and sequece of setting up/taking down arm from platform)  Ambulation  Ambulation?: Yes  WB Status: 50% weight bearing LLE, WBAT LUE with platform walker.   Ambulation 1  Assistance: Minimal assistance  Gait Deviations: Decreased step length;Decreased step height  Distance: 6', 10' X 2 all with seated rest breaks        Exercises  Quad Sets: x10 B  Heelslides: x10 LLE AAROM  Gluteal Sets: x10 B  Ankle Pumps: x10 B        AM-PAC Score     AM-PAC Inpatient Mobility without Stair Climbing Raw Score : 15 (03/24/21 1453)  AM-PAC Inpatient without Stair Climbing T-Scale Score : 43.03 (03/24/21 1453)  Mobility Inpatient CMS 0-100% Score: 47.43 (03/24/21 1453)  Mobility Inpatient without Stair CMS G-Code Modifier : CK (03/24/21 1453)       Goals  Short term goals  Time Frame for Short term goals: 1 week, 3/30/21  Short term goal 1: Pt will perform bed mobility with SBA; Melissa on 3/24  Short term goal 2: Pt will perform sit<>stand transfer with SBA; CGA/ Melissa on 3/24  Short term goal 3: Pt will perform bed<>Chair transfer with platform walker and SBA; Melissa on 3/24  Short term goal 4: By 3/25, pt will tolerate x12-15 reps BLE exercise to assist with functional transfers and ambulation; progressing on 3/24  Short term goal 5: Pt will ambulate 20ft with platform walker and min A while maintaining precautions; amb 6', 10' X 2 with platform walker and Melissa on 3/24  Patient Goals   Patient goals : \"To know my precautions and weight bearing status before I go home. \"    Plan    Plan  Times per week: 7x/wk  Times per day: Daily  Current Treatment Recommendations: Strengthening, ROM, Balance Training, Functional Mobility Training, Transfer Training, Endurance Training, Gait Training, Patient/Caregiver Education & Training, Equipment Evaluation, Education, & procurement, Pain Management, Home Exercise Program, Safety Education & Training  Safety Devices  Type of devices:  All fall risk precautions in place, Call light within reach, Chair alarm in place, Gait belt, Left in chair, Nurse notified  Restraints  Initially in place: No     Therapy Time   Individual Concurrent Group Co-treatment   Time In 1414         Time Out 1452         Minutes 38               If pt is unable to be seen after this session, please let this note serve as discharge summary. Please see case management note for discharge disposition. Thank you.     Time Warden, PTA #[id number]

## 2021-03-24 NOTE — PLAN OF CARE
Problem: Pain:  Goal: Pain level will decrease  Description: Pain level will decrease  3/24/2021 1241 by Elvis Lee RN  Outcome: Ongoing  Note: Pt will be satisfied with pain control. Pt uses numeric pain rating scale with reassessments after pain med administration. Will continue to monitor progression throughout shift.

## 2021-03-24 NOTE — TELEPHONE ENCOUNTER
Spoke with Nichole Jaeger from [facility]. She needed information on SMA including NPI number because he was not in their system yet. This information was provided and she had no further questions.

## 2021-03-24 NOTE — PROGRESS NOTES
Department of Orthopedic Surgery  Physician Assistant   Progress Note    Subjective:       Systemic or Specific Complaints:  Wrist is more sore per patient today. Awaiting therapy this morning. Objective:     Patient Vitals for the past 24 hrs:   BP Temp Temp src Pulse Resp SpO2 Weight   03/24/21 0750 127/72 98.5 °F (36.9 °C) Oral 73 18 99 %    03/24/21 0430       218 lb 4.1 oz (99 kg)   03/24/21 0016 (!) 122/57 98.3 °F (36.8 °C)  77 16 95 %    03/23/21 1945 105/67 98.5 °F (36.9 °C) Oral 86 18 95 %    03/23/21 1413 106/62 99 °F (37.2 °C) Oral 75 16 98 %    03/23/21 1202 115/65 98.9 °F (37.2 °C) Oral 74 15 97 %        General: alert, appears stated age, cooperative and no distress   Wound: Wound clean and dry no evidence of infection. Motion: Painful range of Motion in affected extremity   DVT Exam: No evidence of DVT seen on physical exam.     Additional exam: NVI to left UE and LE  Splint in place to left wrist.  Able to move fingers and thumb without any issues  Mild bloody drainage to mepilex on left hip. Hip swollen but soft. Moving toes and ankle without difficulty. Data Review  CBC:   Lab Results   Component Value Date    WBC 5.7 03/24/2021    RBC 2.78 03/24/2021    HGB 9.2 03/24/2021    HCT 27.2 03/24/2021     03/24/2021       Renal:   Lab Results   Component Value Date     03/24/2021    K 4.0 03/24/2021    K 3.8 03/22/2021    CL 93 03/24/2021    CO2 27 03/24/2021    BUN 7 03/24/2021    CREATININE <0.5 03/24/2021    GLUCOSE 277 03/24/2021    CALCIUM 8.8 03/24/2021            Assessment:      left hip IM nailing, left wrist ORIF, POD #2. Plan:      1:  Continue current plan of care per IM. Labs reviewed and stable post op. Hyponatremia noted, Nephrology following- improving slightly. Pt refused Cardiology consult for syncopal workup.     2:  Continue Deep venous thrombosis prophylaxis- lovenox  3:  Continue Pain Control PRN  4:  PT and OT, 50% WB on left LE and NWB on left wrist.  Okay for platform walker and elbow WB on left UE. 5:  D/c planning for possible return home, pt states her  can assist at home. Will continue to follow progress. Will need DME and HHC. DCP updated and following to assist with needs. 6:  Follow up with Dr. Rafita Orozco in 2 weeks. Instructions placed. Rx on chart.       Otilio Lara PA-C

## 2021-03-24 NOTE — PROGRESS NOTES
Occupational Therapy  Facility/Department: [facility] C5 - MED SURG/ORTHO  Daily Treatment Note  NAME: Calvin Yen  : 1960  MRN: [id number]    Date of Service: 3/24/2021    Discharge Recommendations:  24 hour supervision or assist, Home with Home health OT     Assessment   Performance deficits / Impairments: Decreased functional mobility ; Decreased ADL status; Decreased balance;Decreased high-level IADLs  Assessment: Pt demos good progress toward OT goals, requiring only min A for bed mobility and CGA for functional transfers with platform RW. Pt requires assist to strap LUE in/out of platform. Pt spouse present and both were educated on use of BSC and shower chair, pt reports they have all necessary ADL DME. Pt and spouse verbalize spouse will assist with ADLs PRN, decline trial during this session. Continue OT per POC. OT Education: OT Role;Plan of Care;Precautions; Equipment;Transfer Training;ADL Adaptive Strategies  Patient Education: disease specific: DME, car transfers, role of OT, safety  REQUIRES OT FOLLOW UP: Yes  Activity Tolerance  Activity Tolerance: Patient Tolerated treatment well  Activity Tolerance: BP= 126/76, HR= 93, SPO2= 94% RA  Safety Devices  Safety Devices in place: Yes  Type of devices: Left in chair;Chair alarm in place;Call light within reach;Nurse notified;Gait belt         Patient Diagnosis(es): The encounter diagnosis was Closed displaced intertrochanteric fracture of left femur, initial encounter ([facility] [address] has a past medical history of Hypertension and Type II or unspecified type diabetes mellitus without mention of complication, not stated as uncontrolled. has a past surgical history that includes Femur fracture surgery (Left, 3/22/2021) and Wrist fracture surgery (Left, 3/22/2021).     Restrictions  Restrictions/Precautions  Restrictions/Precautions: Weight Bearing, General Precautions, Fall Risk  Required Braces or Orthoses?: No  Lower Extremity Weight Bearing Restrictions  Left Lower Extremity Weight Bearing: Partial Weight Bearing  Partial Weight Bearing Percentage Or Pounds: 50 %  Upper Extremity Weight Bearing Restrictions  Left Upper Extremity Weight Bearing: Weight Bearing As Tolerated(on platform RW)  Position Activity Restriction  Other position/activity restrictions: \"50% weightbearing only in the leftlower extremity and weightbearing as tolerated in the elbow using a platform walker for six weeks. The patient will start range of motion of the wrist in two weeks. \" Per Dr Jefe Talbert note 3/23/21     Subjective   General  Chart Reviewed: Yes  Patient assessed for rehabilitation services?: Yes  Response to previous treatment: Patient with no complaints from previous session  Family / Caregiver Present: Yes(spouse)  Diagnosis: fall with Left femoral neck & distal radial fx; s/p IM nailing Left hip & Left wirst ORIF 3-22-21    Subjective  Subjective: Pt resting in bed, pleasant and agreeable to OT treatment. Pain Assessment  Pain Assessment: 0-10  Pain Level: 3  Pain Location: Wrist  Pain Orientation: Left  Non-Pharmaceutical Pain Intervention(s): Repositioned; Ambulation/Increased Activity  Pre Treatment Pain Screening  Intervention List: Patient able to continue with treatment  Vital Signs  Patient Currently in Pain: Yes     Orientation  Orientation  Overall Orientation Status: Within Normal Limits     Objective    ADL  Additional Comments: Pt declines need for ADLs at this time, reports spouse will assist at d/c PRN.      Balance  Sitting Balance: Supervision  Standing Balance: Contact guard assistance(with platform RW)  Standing Balance  Activity: functional mobility in room, stand step transfers    Functional Mobility  Functional - Mobility Device: Platform walker  Activity: Other  Assist Level: Contact guard assistance    Bed mobility  Supine to Sit: Minimal assistance(to R with HOB elevated and increased time)     Transfers  Sit to stand: Contact guard assistance  Stand to sit: Contact guard assistance  Transfer Comments: Pt required assist to strap LUE into platform.      Plan   Plan  Times per week: 4-6x/ week  Current Treatment Recommendations: Strengthening, Balance Training, Functional Mobility Training, Safety Education & Training, Self-Care / ADL    AM-PAC Score  AM-PAC Inpatient Daily Activity Raw Score: 16 (03/24/21 1525)  AM-PAC Inpatient ADL T-Scale Score : 35.96 (03/24/21 1525)  ADL Inpatient CMS 0-100% Score: 53.32 (03/24/21 1525)  ADL Inpatient CMS G-Code Modifier : CK (03/24/21 1525)    Goals  Short term goals  Time Frame for Short term goals: 3-5 days(3-28-21)  Short term goal 1: min assist of 1 stand-pivot transfers by 3-26-21-- GOAL MET, pt demos stand step transfers CGA 3/24/21  Short term goal 2: min assist with UE self care by 3-28-21-- ongoing 3/24/21  Short term goal 3: CGA static standing balance for ADL's at BSC/bedside-- ongoing 3/24/21  Patient Goals   Patient goals : home when able       Therapy Time   Individual Concurrent Group Co-treatment   Time In 1415         Time Out 1455         Minutes [address] [place]ILA

## 2021-03-24 NOTE — DISCHARGE INSTR - COC
Continuity of Care Form    Patient Name: Abi Ponce   :  1960  MRN:  [id number]    Admit date:  3/21/2021  Discharge date:  3/27/21    Code Status Order: Full Code   Advance Directives:   [facility] Documentation       Date/Time Healthcare Directive Type of Healthcare Directive Copy in [facility] [address] Agent's Name Healthcare Agent's Phone Number    21 1220  No, patient does not have an advance directive for healthcare treatment -- -- -- -- --    21 1825  No, patient does not have an advance directive for healthcare treatment -- -- -- -- --            Admitting Physician:  Darrell Matuet MD  PCP: Nicolette Roth MD    Discharging Nurse: [facility] Unit/Room#: 0522/0522-01  Discharging Unit Phone Number: [phone]    Emergency Contact:   Extended Emergency Contact Information  Primary Emergency Contact: [address] [place] [address] Phone: [phone]  Relation: Spouse  Secondary Emergency Contact: [address] [facility], [address] Phone: [phone]  Relation: Child    Past Surgical History:  Past Surgical History:   Procedure Laterality Date    FEMUR FRACTURE SURGERY Left 3/22/2021    LEFT GAMMA NAIL INSERTION LAURA performed by Carlo Crockett MD at [facility] Left 3/22/2021    LEFT WRIST OPEN REDUCTION INTERNAL FIXATION performed by Carlo Crockett MD at [facility]       Immunization History: There is no immunization history on file for this patient.     Active Problems:  Patient Active Problem List   Diagnosis Code    Peripheral neuropathy G62.9    Essential hypertension I10    Type 2 diabetes mellitus without complication ([address] V22.8    Urinary incontinence, urge N39.41    Depression F32.9    Closed fracture of neck of left femur ([address] S72.002A    Syncope R55    Hyponatremia E87.1    Polydipsia R63.1    Acute hyperglycemia R73.9    DM (diabetes mellitus), type 2, uncontrolled ([address] E11.65    Closed fracture of distal end of left radius S52.502A    Thrombocytopenia (HCC) D69.6    Closed displaced intertrochanteric fracture of left femur ([facility]) P77.890W    Retained orthopedic hardware Z96.9       Isolation/Infection:   Isolation            No Isolation          Patient Infection Status       Infection Onset Added Last Indicated Last Indicated By Review Planned Expiration Resolved Resolved By    None active    Resolved    COVID-19 Rule Out 03/22/21 03/22/21 03/22/21 COVID-19, Rapid (Ordered)   03/22/21 Rule-Out Test Resulted            Nurse Assessment:  Last Vital Signs: /72   Pulse 73   Temp 98.5 °F (36.9 °C) (Oral)   Resp 18   Ht 5' 2\" (1.575 m)   Wt 218 lb 4.1 oz (99 kg)   SpO2 99%   BMI 39.92 kg/m²     Last documented pain score (0-10 scale): Pain Level: 7  Last Weight:   Wt Readings from Last 1 Encounters:   03/24/21 218 lb 4.1 oz (99 kg)     Mental Status:  oriented and alert    IV Access:  - None    Nursing Mobility/ADLs:  Walking   Independent  Transfer  Assisted  Bathing  Assisted  Dressing  Assisted  Toileting  Assisted  Feeding  Assisted  Med Admin  Independent  Med Delivery   none    Wound Care Documentation and Therapy:        Elimination:  Continence:   · Bowel: Yes  · Bladder: Yes  Urinary Catheter: None   Colostomy/Ileostomy/Ileal Conduit: No       Date of Last BM: 3/27/21    Intake/Output Summary (Last 24 hours) at 3/24/2021 1120  Last data filed at 3/24/2021 0430  Gross per 24 hour   Intake 600 ml   Output 3500 ml   Net -2900 ml     I/O last 3 completed shifts: In: 840 [P.O.:840]  Out: 5275 [Urine:5275]    Safety Concerns:     None    Impairments/Disabilities:      None    Nutrition Therapy:  Current Nutrition Therapy:   - Oral Diet:  General    Routes of Feeding: Oral  Liquids:  Thin Liquids  Daily Fluid Restriction: yes - amount 2L  Last Modified Barium Swallow with Video (Video Swallowing Test): not done    Treatments at the Time of Hospital Discharge:   Respiratory Treatments: RA Oxygen Therapy:  is not on home oxygen therapy. Ventilator:    - No ventilator support    Rehab Therapies: Physical Therapy and Occupational Therapy  Weight Bearing Status/Restrictions: No weight bearing restirctions  Other Medical Equipment (for information only, NOT a DME order):  walker  Other Treatments: na    Patient's personal belongings (please select all that are sent with patient):  None    RN SIGNATURE:  Electronically signed by Jesus Dwyer RN on 3/27/21 at 2:25 PM EDT    CASE MANAGEMENT/SOCIAL WORK SECTION    Inpatient Status Date: 3/21    Readmission Risk Assessment Score:  Readmission Risk              Risk of Unplanned Readmission:        15           Discharging to Facility/ Agency   · Name: [facility]  · Address:  · Phone: [phone]  · Fax:    Dialysis Facility (if applicable)   · Name:  · Address:  · Dialysis Schedule:  · Phone:  · Fax:    / signature: Electronically signed by Angela Coleman RN on 3/27/21 at 1:33 PM EDT    PHYSICIAN SECTION    Prognosis: Good    Condition at Discharge: Stable    Rehab Potential (if transferring to Rehab): Good    Recommended Labs or Other Treatments After Discharge: N/A    Physician Certification: I certify the above information and transfer of Wilma Malcolm  is necessary for the continuing treatment of the diagnosis listed and that she requires [address] for less 30 days. Update Admission H&P: No change in H&P    PHYSICIAN SIGNATURE:  Electronically signed by Jamir Aguilera MD on 3/27/21 at 9:22 AM EDT            Discharge Instructions     To prevent Clot formation, you have been placed on the following medication:  o Lovenox 40mg daily subcutaneously for 14 days    Surgical Site Care:  o Dressing change every 5-7 days with mepilex dressing.   Can be changed at home until incision healed  o If you have staples or sutures, these will be removed on post-operative day 10-12 and steri-strips applied  o If you have glue, this will be removed in the office or gradually wear off as your incision heals  o Showering is permitted if waterproof mepilex dressing is applied or when staples are removed and all areas of incision are healed.  Physical Therapy:  o Weight Bearing Status:     Partial weight bearing (50%) on left leg, elbow weight bearing on left arm with platform walker  o 3 times per week via [address]  o Precautions  - Per Physical Therapy handout   Pain Medications  o You were given oxycodone (Oxycontin, Oxyir)  o Wean off pain medications as you deem appropriate as long as pain is under control  o Be sure to drink plenty of fluids (recommend water) while taking narcotic pain medications to prevent constipation  o You may take an over the counter laxative or stool softener as needed to prevent/treat constipation as well, we recommend Senokot S OTC. We recommend that you consider taking these medications the entire time you are taking pain medication.  Cold packs/Ice packs/Machine  o May be used as much as necessary to reduce swelling/inflammation/soreness  o Be sure to have a barrier (cloth, clothing, towel) between your skin/incision and the ice pack to prevent frostbite   Contact [facility] office if  o Increased redness, swelling, drainage of any kind, and/or pain to surgery site. As well as new onset fevers and or chills. These could signify an infection. o Calf or thigh tenderness to touch as well as increased swelling or redness. This could signify a clot formation. o Numbness or tingling to an area around the incision site or below the incision site (toes). o Any rash appears, increased  or new onset nausea/vomiting occur. This may indicate a reaction to a medication. [facility] Phone # [phone]. [address] and Sports Medicine.  Follow up with Dr. Jassi Calvillo in 2 weeks, call above number for appointment.  Please continue to use your Incentive Spirometer at home every hour while awake.

## 2021-03-24 NOTE — PLAN OF CARE
Bed in lowest position, wheels locked, 2/4 side rails up. Bed alarm in place, call light within reach. HS .     Problem: Pain:  Goal: Pain level will decrease  Description: Pain level will decrease  3/24/2021 0420 by Gretta Richards RN  Outcome: Ongoing  3/23/2021 1657 by Perico Del Castillo RN  Outcome: Ongoing     Problem: Falls - Risk of:  Goal: Will remain free from falls  Description: Will remain free from falls  3/24/2021 0420 by Gretta Richards RN  Outcome: Ongoing  3/23/2021 1657 by Perico Del Castillo RN  Outcome: Met This Shift     Problem: Metabolic:  Goal: Ability to maintain appropriate glucose levels will improve  Description: Ability to maintain appropriate glucose levels will improve  Outcome: Ongoing

## 2021-03-25 PROBLEM — S72.002A CLOSED FRACTURE OF NECK OF LEFT FEMUR (HCC): Status: RESOLVED | Noted: 2021-03-21 | Resolved: 2021-03-25

## 2021-03-25 LAB
ANION GAP SERPL CALCULATED.3IONS-SCNC: 8 MMOL/L (ref 3–16)
BUN BLDV-MCNC: 6 MG/DL (ref 7–20)
CALCIUM SERPL-MCNC: 8.8 MG/DL (ref 8.3–10.6)
CHLORIDE BLD-SCNC: 91 MMOL/L (ref 99–110)
CO2: 27 MMOL/L (ref 21–32)
CREAT SERPL-MCNC: <0.5 MG/DL (ref 0.6–1.2)
CREATININE URINE: 126.8 MG/DL (ref 28–259)
GFR AFRICAN AMERICAN: >60
GFR NON-AFRICAN AMERICAN: >60
GLUCOSE BLD-MCNC: 191 MG/DL (ref 70–99)
GLUCOSE BLD-MCNC: 230 MG/DL (ref 70–99)
GLUCOSE BLD-MCNC: 232 MG/DL (ref 70–99)
GLUCOSE BLD-MCNC: 237 MG/DL (ref 70–99)
GLUCOSE BLD-MCNC: 310 MG/DL (ref 70–99)
HCT VFR BLD CALC: 26.9 % (ref 36–48)
HEMOGLOBIN: 9.2 G/DL (ref 12–16)
MCH RBC QN AUTO: 33.3 PG (ref 26–34)
MCHC RBC AUTO-ENTMCNC: 34.1 G/DL (ref 31–36)
MCV RBC AUTO: 97.4 FL (ref 80–100)
OSMOLALITY URINE: 484 MOSM/KG (ref 390–1070)
PDW BLD-RTO: 13.5 % (ref 12.4–15.4)
PERFORMED ON: ABNORMAL
PLATELET # BLD: 119 K/UL (ref 135–450)
PMV BLD AUTO: 8.9 FL (ref 5–10.5)
POTASSIUM SERPL-SCNC: 4.2 MMOL/L (ref 3.5–5.1)
RBC # BLD: 2.76 M/UL (ref 4–5.2)
SODIUM BLD-SCNC: 126 MMOL/L (ref 136–145)
SODIUM URINE: 37 MMOL/L
URIC ACID, SERUM: 3.3 MG/DL (ref 2.6–6)
WBC # BLD: 5.9 K/UL (ref 4–11)

## 2021-03-25 PROCEDURE — 83935 ASSAY OF URINE OSMOLALITY: CPT

## 2021-03-25 PROCEDURE — 2580000003 HC RX 258: Performed by: INTERNAL MEDICINE

## 2021-03-25 PROCEDURE — 84550 ASSAY OF BLOOD/URIC ACID: CPT

## 2021-03-25 PROCEDURE — 97530 THERAPEUTIC ACTIVITIES: CPT

## 2021-03-25 PROCEDURE — 6370000000 HC RX 637 (ALT 250 FOR IP): Performed by: ORTHOPAEDIC SURGERY

## 2021-03-25 PROCEDURE — 36415 COLL VENOUS BLD VENIPUNCTURE: CPT

## 2021-03-25 PROCEDURE — 6360000002 HC RX W HCPCS: Performed by: ORTHOPAEDIC SURGERY

## 2021-03-25 PROCEDURE — 97116 GAIT TRAINING THERAPY: CPT

## 2021-03-25 PROCEDURE — 97110 THERAPEUTIC EXERCISES: CPT

## 2021-03-25 PROCEDURE — 6370000000 HC RX 637 (ALT 250 FOR IP): Performed by: HOSPITALIST

## 2021-03-25 PROCEDURE — 6370000000 HC RX 637 (ALT 250 FOR IP): Performed by: INTERNAL MEDICINE

## 2021-03-25 PROCEDURE — 6370000000 HC RX 637 (ALT 250 FOR IP): Performed by: PHYSICIAN ASSISTANT

## 2021-03-25 PROCEDURE — 82570 ASSAY OF URINE CREATININE: CPT

## 2021-03-25 PROCEDURE — APPNB30 APP NON BILLABLE TIME 0-30 MINS: Performed by: PHYSICIAN ASSISTANT

## 2021-03-25 PROCEDURE — 1200000000 HC SEMI PRIVATE

## 2021-03-25 PROCEDURE — 80048 BASIC METABOLIC PNL TOTAL CA: CPT

## 2021-03-25 PROCEDURE — 85027 COMPLETE CBC AUTOMATED: CPT

## 2021-03-25 PROCEDURE — 84300 ASSAY OF URINE SODIUM: CPT

## 2021-03-25 RX ORDER — INSULIN GLARGINE 100 [IU]/ML
26 INJECTION, SOLUTION SUBCUTANEOUS NIGHTLY
Status: DISCONTINUED | OUTPATIENT
Start: 2021-03-25 | End: 2021-03-26

## 2021-03-25 RX ORDER — SODIUM CHLORIDE 1000 MG
1 TABLET, SOLUBLE MISCELLANEOUS
Status: DISCONTINUED | OUTPATIENT
Start: 2021-03-25 | End: 2021-03-27 | Stop reason: HOSPADM

## 2021-03-25 RX ADMIN — ENOXAPARIN SODIUM 40 MG: 40 INJECTION SUBCUTANEOUS at 09:00

## 2021-03-25 RX ADMIN — GABAPENTIN 100 MG: 100 CAPSULE ORAL at 08:59

## 2021-03-25 RX ADMIN — INSULIN LISPRO 4 UNITS: 100 INJECTION, SOLUTION INTRAVENOUS; SUBCUTANEOUS at 12:41

## 2021-03-25 RX ADMIN — SODIUM CHLORIDE: 9 INJECTION, SOLUTION INTRAVENOUS at 16:14

## 2021-03-25 RX ADMIN — OXYCODONE HYDROCHLORIDE 5 MG: 5 TABLET ORAL at 20:00

## 2021-03-25 RX ADMIN — INSULIN GLARGINE 26 UNITS: 100 INJECTION, SOLUTION SUBCUTANEOUS at 19:55

## 2021-03-25 RX ADMIN — ESCITALOPRAM OXALATE 10 MG: 10 TABLET ORAL at 09:00

## 2021-03-25 RX ADMIN — ATENOLOL 25 MG: 25 TABLET ORAL at 08:59

## 2021-03-25 RX ADMIN — FAMOTIDINE 20 MG: 20 TABLET ORAL at 08:59

## 2021-03-25 RX ADMIN — FAMOTIDINE 20 MG: 20 TABLET ORAL at 19:54

## 2021-03-25 RX ADMIN — OXYCODONE HYDROCHLORIDE 5 MG: 5 TABLET ORAL at 05:28

## 2021-03-25 RX ADMIN — DOCUSATE SODIUM 50 MG AND SENNOSIDES 8.6 MG 1 TABLET: 8.6; 5 TABLET, FILM COATED ORAL at 08:59

## 2021-03-25 RX ADMIN — GABAPENTIN 100 MG: 100 CAPSULE ORAL at 19:54

## 2021-03-25 RX ADMIN — INSULIN LISPRO 2 UNITS: 100 INJECTION, SOLUTION INTRAVENOUS; SUBCUTANEOUS at 19:55

## 2021-03-25 RX ADMIN — INSULIN LISPRO 4 UNITS: 100 INJECTION, SOLUTION INTRAVENOUS; SUBCUTANEOUS at 16:45

## 2021-03-25 RX ADMIN — AMLODIPINE BESYLATE 5 MG: 5 TABLET ORAL at 19:53

## 2021-03-25 RX ADMIN — INSULIN LISPRO 2 UNITS: 100 INJECTION, SOLUTION INTRAVENOUS; SUBCUTANEOUS at 09:00

## 2021-03-25 RX ADMIN — Medication 1 G: at 20:09

## 2021-03-25 ASSESSMENT — PAIN DESCRIPTION - LOCATION: LOCATION: WRIST

## 2021-03-25 ASSESSMENT — PAIN SCALES - GENERAL: PAINLEVEL_OUTOF10: 0

## 2021-03-25 ASSESSMENT — PAIN DESCRIPTION - ORIENTATION: ORIENTATION: LEFT

## 2021-03-25 NOTE — PROGRESS NOTES
Hospitalist Progress Note      PCP: Veronica Braun MD    Date of Admission: 3/21/2021    Hospital Course: \"57 y.o. female presented to [facility] EDfor left-sided hip, wrist, and foot pain s/p syncope and collapse 3 days PTA. She had syncopal episode associated with dizziness. The patient admits that she drinks approximately 4+ liters of water daily despite recommendations for fluid restriction by physicians. She was initially evaluated [facility] at which time she was diagnosed with left wrist fracture but told that she had no evidence of hip fracture. She was then reevaluated at [facility] ED at which time she does have minimally displaced intertrochanteric fracture of her left hip. Labs were obtained and notable for moderate hyponatremia at 125, thrombocytopenia, and acute hyperglycemia due to uncontrolled diabetes. She was transferred to [facility] for anticipated surgical intervention by orthopedics team.\"       Subjective:   No complaints. No chest pain. No n/v.    Medications:  Reviewed    Infusion Medications    sodium chloride 75 mL/hr at 03/24/21 1716    dextrose       Scheduled Medications    insulin glargine  22 Units Subcutaneous Nightly    sodium chloride flush  10 mL Intravenous 2 times per day    sennosides-docusate sodium  1 tablet Oral BID    enoxaparin  40 mg Subcutaneous Daily    escitalopram  10 mg Oral Daily    famotidine  20 mg Oral BID    gabapentin  100 mg Oral BID    atenolol  25 mg Oral Daily    amLODIPine  5 mg Oral Daily    insulin lispro  0-12 Units Subcutaneous TID WC    insulin lispro  0-6 Units Subcutaneous Nightly    sodium chloride flush  10 mL Intravenous 2 times per day     PRN Meds: oxyCODONE, morphine, sodium chloride flush, magnesium hydroxide, glucose, dextrose, glucagon (rDNA), dextrose, sodium chloride flush, potassium chloride **OR** potassium alternative oral replacement **OR** potassium chloride, magnesium sulfate, senna, acetaminophen **OR** acetaminophen, ondansetron **OR** ondansetron      Intake/Output Summary (Last 24 hours) at 3/25/2021 1606  Last data filed at 3/25/2021 0913  Gross per 24 hour   Intake 240 ml   Output 3200 ml   Net -2960 ml       Physical Exam Performed:    /71   Pulse 66   Temp 98.8 °F (37.1 °C) (Oral)   Resp 16   Ht 5' 2\" (1.575 m)   Wt 218 lb 4.1 oz (99 kg)   SpO2 99%   BMI 39.92 kg/m²     General appearance: No apparent distress, appears stated age and cooperative. HEENT: Pupils equal, round, and reactive to light. Conjunctivae/corneas clear. Neck: Supple, with full range of motion. No jugular venous distention. Trachea midline. Respiratory:  Normal respiratory effort. Clear to auscultation, bilaterally without Rales/Wheezes/Rhonchi. Cardiovascular: Regular rate and rhythm with normal S1/S2 without murmurs, rubs or gallops. Abdomen: Soft, non-tender, non-distended with normal bowel sounds. Musculoskeletal: No clubbing, cyanosis or edema bilaterally. Left hip dressing and wrist dressing. Skin: Skin color, texture, turgor normal.  No rashes or lesions. Neurologic:  Neurovascularly intact without any focal sensory/motor deficits. Cranial nerves: II-XII intact, grossly non-focal.  Psychiatric: Alert and oriented, thought content appropriate, normal insight      Labs:   Recent Labs     03/23/21  0602 03/24/21  0936 03/25/21  0906   WBC  --  5.7 5.9   HGB 10.0* 9.2* 9.2*   HCT 28.9* 27.2* 26.9*   PLT  --  109* 119*     Recent Labs     03/23/21  0602 03/24/21  0936 03/25/21  0906   * 128* 126*   K 4.5 4.0 4.2   CL 90* 93* 91*   CO2 25 27 27   BUN 9 7 6*   CREATININE <0.5* <0.5* <0.5*   CALCIUM 9.3 8.8 8.8     No results for input(s): AST, ALT, BILIDIR, BILITOT, ALKPHOS in the last 72 hours. No results for input(s): INR in the last 72 hours. No results for input(s): Coralyn Al in the last 72 hours.     Urinalysis:    No results found for: NITRU, [address], BACTERIA, TA[address]NA [address] [address]    Radiology:  XR FEMUR LEFT (MIN 2 VIEWS)   Final Result   Intraoperative spot films as above         XR CHEST PORTABLE   Final Result   Clear lungs. Cardiomegaly. No acute cardiopulmonary abnormality. Assessment/Plan:    Active Hospital Problems    Diagnosis    Closed displaced intertrochanteric fracture of left femur ([facility] [address] [S72.142A]    Retained orthopedic hardware [Z96.9]    Syncope [R55]    Hyponatremia [E87.1]    Polydipsia [R63.1]    Acute hyperglycemia [R73.9]    DM (diabetes mellitus), type 2, uncontrolled ([facility] [address] [E11.65]    Closed fracture of distal end of left radius [S52.502A]    Thrombocytopenia (HCC) [D69.6]    Peripheral neuropathy [G62.9]    Essential hypertension [I10]        Left femoral neck fracture/left distal radius fracture  S/p left wrist ORIF and left hip nail on 3/22/21  -Pain control  -PT/OT  -Management as per orthopedic surgery     Syncope and collapse-possibly due to hyponatremia  Fall precautions in place  Echo shows EF 55-60%,mild LVH,grade 2 diastolic dysfunction,Moderate Pulm HTN. She had Cardiology consultation but declined and would like to talk to PCP on discharge.  -Telemetry    Left eye hematoma  -Conservative management     Hyponatremia-due to hypovolemia. Urine studies consistent with low volume  Sodium improved slowly  -Osmolar studies studies  Continue IV hydration. Pt has no IV access. Attempts are being made at bedside  Nephrology managing     Uncontrolled type II DM with acute hyperglycemia  A1c 5.5  Glucose still high. Increase lantus to 26 units qhs and continue medium coverage scale     Periorbital hematoma status post head contusion/trauma  Patient reports head CT performed at initial evaluation and noted to be negative for acute abnormality  Fall parameters in place    DVT Prophylaxis: Lovenox   Diet: DIET GENERAL; Carb Control: 4 carb choices (60 gms)/meal; No Added Salt (3-4 GM);  Daily Fluid Restriction: 1800 ml  Dietary Nutrition Supplements: Standard High Calorie Oral Supplement  Code Status: Full Code    PT/OT Eval Status: Ordered    Dispo -DC home with home health when sodium is improved. Pt now has pending medicaid number    Chito Christine MD

## 2021-03-25 NOTE — PROGRESS NOTES
Units, Nightly  morphine (PF) injection 2 mg, Q4H PRN  sodium chloride flush 0.9 % injection 10 mL, 2 times per day  sodium chloride flush 0.9 % injection 10 mL, PRN  sennosides-docusate sodium (SENOKOT-S) 8.6-50 MG tablet 1 tablet, BID  magnesium hydroxide (MILK OF MAGNESIA) 400 MG/5ML suspension 30 mL, Daily PRN  enoxaparin (LOVENOX) injection 40 mg, Daily  escitalopram (LEXAPRO) tablet 10 mg, Daily  famotidine (PEPCID) tablet 20 mg, BID  gabapentin (NEURONTIN) capsule 100 mg, BID  atenolol (TENORMIN) tablet 25 mg, Daily  amLODIPine (NORVASC) tablet 5 mg, Daily  insulin lispro (HUMALOG) injection vial 0-12 Units, TID WC  insulin lispro (HUMALOG) injection vial 0-6 Units, Nightly  glucose (GLUTOSE) 40 % oral gel 15 g, PRN  dextrose 50 % IV solution, PRN  glucagon (rDNA) injection 1 mg, PRN  dextrose 5 % solution, PRN  sodium chloride flush 0.9 % injection 10 mL, 2 times per day  sodium chloride flush 0.9 % injection 10 mL, PRN  potassium chloride (KLOR-CON M) extended release tablet 40 mEq, PRN    Or  potassium bicarb-citric acid (EFFER-K) effervescent tablet 40 mEq, PRN    Or  potassium chloride 10 mEq/100 mL IVPB (Peripheral Line), PRN  magnesium sulfate 2000 mg in 50 mL IVPB premix, PRN  senna (SENOKOT) tablet 8.6 mg, Daily PRN  acetaminophen (TYLENOL) tablet 650 mg, Q6H PRN    Or  acetaminophen (TYLENOL) suppository 650 mg, Q6H PRN  ondansetron (ZOFRAN-ODT) disintegrating tablet 4 mg, Q8H PRN    Or  ondansetron (ZOFRAN) injection 4 mg, Q6H PRN        Review of Systems:   14 point ROS obtained but were negative except mentioned in HPI      Physical exam:     Vitals:  /75   Pulse 76   Temp 98.1 °F (36.7 °C) (Oral)   Resp 16   Ht 5' 2\" (1.575 m)   Wt 218 lb 4.1 oz (99 kg)   SpO2 96%   BMI 39.92 kg/m²   Constitutional:  OAA X3 NAD  Skin: no rash, turgor wnl  Heent:  eomi, mmm  Neck: no bruits or jvd noted  Cardiovascular:  S1, S2 without m/r/g  Respiratory: CTA B without w/r/r  Abdomen:  +bs, soft, nt, nd  Ext:  Left hip tender    Psychiatric: mood and affect appropriate  Musculoskeletal:  Rom, muscular strength intact    Data:   Labs:  CBC:   Recent Labs     03/23/21  0602 03/24/21  0936 03/25/21  0906   WBC  --  5.7 5.9   HGB 10.0* 9.2* 9.2*   PLT  --  109* 119*     BMP:    Recent Labs     03/23/21  0602 03/24/21  0936 03/25/21  0906   * 128* 126*   K 4.5 4.0 4.2   CL 90* 93* 91*   CO2 25 27 27   BUN 9 7 6*   CREATININE <0.5* <0.5* <0.5*   GLUCOSE 260* 277* 310*     Ca/Mg/Phos:   Recent Labs     03/23/21  0602 03/24/21  0936 03/25/21  0906   CALCIUM 9.3 8.8 8.8     Hepatic:   No results for input(s): AST, ALT, ALB, BILITOT, ALKPHOS in the last 72 hours. Troponin: No results for input(s): TROPONINI in the last 72 hours. BNP: No results for input(s): BNP in the last 72 hours. Lipids: No results for input(s): CHOL, TRIG, HDL, LDLCALC, LABVLDL in the last 72 hours. ABGs: No results for input(s): PHART, PO2ART, XFK5BOY in the last 72 hours. INR:   No results for input(s): INR in the last 72 hours. UA:No results for input(s): Jarett Marinas, GLUCOSEU, BILIRUBINUR, Delray Bouillon, BLOODU, PHUR, PROTEINU, UROBILINOGEN, NITRU, LEUKOCYTESUR, LABMICR, URINETYPE in the last 72 hours. Urine Microscopic: No results for input(s): LABCAST, BACTERIA, COMU, HYALCAST, WBCUA, RBCUA, EPIU in the last 72 hours. Urine Culture: No results for input(s): LABURIN in the last 72 hours. Urine Chemistry: No results for input(s): Nelwyn Gain, PROTEINUR, NAUR in the last 72 hours. IMAGING:  XR FEMUR LEFT (MIN 2 VIEWS)   Final Result   Intraoperative spot films as above         XR CHEST PORTABLE   Final Result   Clear lungs. Cardiomegaly. No acute cardiopulmonary abnormality. Assessment/Plan       1.   Hyponatremia        Etiology : could be volume depletion           Took NSAIDs recently         Urine Osm  476 , Urine Na < 20 on 3/22         Serum Na trend : 133 -----> 125 ----> 130---> 126 She made 4.5  Liter Urine in last 24 hrs and net 8.7 Liter negative since admission         Will continue IVF NS @ 100 ml/hr for now             Re - Check Urine Na, Cr, U osm, serum Uric acid         2. Hip fracture          Ortho following    3. DM type 2     4.  Acid- base/ Electrolyte imbalance : bambi                    Thank you for allowing us to participate in care of [address] [place] [address] free to contact me   [facility] [address]  Office : [phone]  Fax :[phone]

## 2021-03-25 NOTE — CARE COORDINATION
CASE MANAGEMENT DISCHARGE SUMMARY      Discharge to: home /[address] Equipment ordered/agency: RW, LUE platform attachment, [employer] aware    Transportation: private     Confirmed discharge plan with:     Patient: yes     RN, name: Elsi Escalera RN    Note: Pt requesting hospital bed, will be self pay, pt aware. CM informed Sherita Marinocaitlin of request.  Kirsten Driscoll RN        Addendum:  CM sent the following message regarding d/c to hospitalist:  FYI:  Pt has DME for d/c. However, pt Na is 126 today and does not have nephro on board.   Notes imply they are following, but they are not on the treatment team. Kirsten Driscoll RN

## 2021-03-25 NOTE — PROGRESS NOTES
Delivered walker to [facility].   Thanks for the referral.  Harry Ryan  3/25/2021

## 2021-03-25 NOTE — PROGRESS NOTES
Physical Therapy  Facility/Department: [facility] C5 - MED SURG/ORTHO  Daily Treatment Note  NAME: Markos Wei  : 1960  MRN: [id number]    Date of Service: 3/25/2021    Discharge Recommendations:  24 hour supervision or assist, Home with Home health PT   PT Equipment Recommendations  Equipment Needed: Yes  Mobility Devices: Walker  Other: LUE Platform rolling walker. Assessment   Body structures, Functions, Activity limitations: Decreased functional mobility ; Decreased ROM; Decreased strength;Decreased balance;Decreased endurance; Increased pain  Assessment: Pt porgressing nicely with PT today needing CGA/min for all transfers and ambulation 15'X 2 using platform walker and observing 50% LLE. Pt wll need a platform RW for home use. Pt is reocmmended for home D/.C with 24 hr A form spouse and HHPT. Treatment Diagnosis: Impaired mobility  Prognosis: Good  Decision Making: Medium Complexity  Patient Education: Pt was educated regarding proper transfer technique in order to maintain various precautions - verbalized understanding, will benefit from reinforcement. REQUIRES PT FOLLOW UP: Yes  Activity Tolerance  Activity Tolerance: Patient Tolerated treatment well  Activity Tolerance: 76 BPM  130/75 BP  94% on RA     Patient Diagnosis(es): The encounter diagnosis was Closed displaced intertrochanteric fracture of left femur, initial encounter ([facility] [address] has a past medical history of Hypertension and Type II or unspecified type diabetes mellitus without mention of complication, not stated as uncontrolled. has a past surgical history that includes Femur fracture surgery (Left, 3/22/2021) and Wrist fracture surgery (Left, 3/22/2021).     Restrictions  Restrictions/Precautions  Restrictions/Precautions: Weight Bearing, General Precautions, Fall Risk  Required Braces or Orthoses?: No  Lower Extremity Weight Bearing Restrictions  Left Lower Extremity Weight Bearing: Partial Weight Bearing  Partial Weight Bearing Stair Climbing Raw Score : 15 (03/25/21 1113)  AM-PAC Inpatient without Stair Climbing T-Scale Score : 43.03 (03/25/21 1113)  Mobility Inpatient CMS 0-100% Score: 47.43 (03/25/21 1113)  Mobility Inpatient without Stair CMS G-Code Modifier : CK (03/25/21 1113)       Goals  Short term goals  Time Frame for Short term goals: 1 week, 3/30/21  Short term goal 1: Pt will perform bed mobility with SBA; Melissa on 3/25  Short term goal 2: Pt will perform sit<>stand transfer with SBA; CGA/ Melissa on 3/25  Short term goal 3: Pt will perform bed<>Chair transfer with platform walker and SBA; Melissa on 3/24  Short term goal 4: By 3/25, pt will tolerate x12-15 reps BLE exercise to assist with functional transfers and ambulation; progressing on 3/25  Short term goal 5: Pt will ambulate 20ft with platform walker and min A while maintaining precautions; amb 15' X 2 with platform walker and Melissa on 3/25  Patient Goals   Patient goals : \"To know my precautions and weight bearing status before I go home. \"    Plan    Plan  Times per week: 7x/wk  Times per day: Daily  Current Treatment Recommendations: Strengthening, ROM, Balance Training, Functional Mobility Training, Transfer Training, Endurance Training, Gait Training, Patient/Caregiver Education & Training, Equipment Evaluation, Education, & procurement, Pain Management, Home Exercise Program, Safety Education & Training  Safety Devices  Type of devices:  All fall risk precautions in place, Call light within reach, Chair alarm in place, Gait belt, Left in chair, Nurse notified  Restraints  Initially in place: No     Therapy Time   Individual Concurrent Group Co-treatment   Time In 1032         Time Out 1110         Minutes 38         Timed Code Treatment Minutes: [address]

## 2021-03-25 NOTE — PROGRESS NOTES
Department of Orthopedic Surgery  Physician Assistant   Progress Note    Subjective:       Systemic or Specific Complaints:  None per ortho today, feeling well. Wrist and hip pain controlled. Objective:     Patient Vitals for the past 24 hrs:   BP Temp Temp src Pulse Resp SpO2   03/25/21 0715 130/75 98.1 °F (36.7 °C) Oral 76  96 %   03/25/21 0117   Oral  16    03/24/21 1944  99 °F (37.2 °C) Oral  19    03/24/21 1605 135/75 98.5 °F (36.9 °C) Oral 68 18 97 %       General: alert, appears stated age, cooperative and no distress   Wound: Wound clean and dry no evidence of infection. Motion: Painful range of Motion in affected extremity   DVT Exam: No evidence of DVT seen on physical exam.     Additional exam: NVI to left UE and LE  Splint in place to left wrist.  Able to move fingers and thumb without any issues  Mild bloody drainage to mepilex on left hip. Hip swollen but soft. Moving toes and ankle without difficulty. Data Review  CBC:   Lab Results   Component Value Date    WBC 5.9 03/25/2021    RBC 2.76 03/25/2021    HGB 9.2 03/25/2021    HCT 26.9 03/25/2021     03/25/2021       Renal:   Lab Results   Component Value Date     03/25/2021    K 4.2 03/25/2021    K 3.8 03/22/2021    CL 91 03/25/2021    CO2 27 03/25/2021    BUN 6 03/25/2021    CREATININE <0.5 03/25/2021    GLUCOSE 310 03/25/2021    CALCIUM 8.8 03/25/2021            Assessment:      left hip IM nailing, left wrist ORIF, POD #3. Plan:      1:  Continue current plan of care per IM. Labs reviewed and stable post op. Hyponatremia noted, Nephrology following- improving slightly. ? awaiting further nephro recs, Na 126 this am.  IVF running and rodriguez in place. Pt refused Cardiology consult for syncopal workup. 2:  Continue Deep venous thrombosis prophylaxis- lovenox  3:  Continue Pain Control PRN  4:  PT and OT, 50% WB on left LE and NWB on left wrist.  Okay for platform walker and elbow WB on left UE.   5:  D/c planning for possible return home, pt states her  can assist at home. ANU and [address] arranged. DCP updated. 6:  Follow up with Dr. Maryse Fernández in 2 weeks. Instructions placed. Rx on chart.       Walter Shafer PA-C

## 2021-03-25 NOTE — PROGRESS NOTES
Occupational Therapy  Facility/Department: [facility] C5 - MED SURG/ORTHO  Daily Treatment Note  NAME: Ronnie Rogers  : 1960  MRN: [id number]    Date of Service: 3/25/2021    Discharge Recommendations:  24 hour supervision or assist, Home with Home health OT     Assessment   Performance deficits / Impairments: Decreased functional mobility ; Decreased ADL status; Decreased balance;Decreased high-level IADLs  Assessment: Pt requiring CGA for functional mobility and transfers using platform RW. Pt demos need for CGA for transfers on/off shower chair using platform RW, pt refused sitting on toilet d/t it not being the right height so she preferred practicing with the shower chair. Pt progressing with OT treatment. Pt able to perform 20reps of UE exercises. Pt would benefit from continued OT treatment to address the above noted occupational performance deficits. Prognosis: Good  OT Education: OT Role;Plan of Care;Precautions; Equipment;Transfer Training;Home Exercise Program  Patient Education: disease specific: role of OT, therex, and transfers  REQUIRES OT FOLLOW UP: Yes    Activity Tolerance  Activity Tolerance: Patient Tolerated treatment well  Activity Tolerance: QF=741/67, HR=64, O2=97%    Safety Devices  Safety Devices in place: Yes  Type of devices: Left in chair;Chair alarm in place;Call light within reach;Gait belt       Patient Diagnosis(es): The encounter diagnosis was Closed displaced intertrochanteric fracture of left femur, initial encounter ([facility] [address] has a past medical history of Hypertension and Type II or unspecified type diabetes mellitus without mention of complication, not stated as uncontrolled. has a past surgical history that includes Femur fracture surgery (Left, 3/22/2021) and Wrist fracture surgery (Left, 3/22/2021).     Restrictions  Restrictions/Precautions  Restrictions/Precautions: Weight Bearing, General Precautions, Fall Risk  Required Braces or Orthoses?: No  Lower Extremity Weight Bearing Restrictions  Left Lower Extremity Weight Bearing: Partial Weight Bearing  Partial Weight Bearing Percentage Or Pounds: 50 %  Upper Extremity Weight Bearing Restrictions  Left Upper Extremity Weight Bearing: Weight Bearing As Tolerated  Position Activity Restriction  Other position/activity restrictions: \"50% weightbearing only in the leftlower extremity and weightbearing as tolerated in the elbow using a platform walker for six weeks. The patient will start range of motion of the wrist in two weeks. \" Per Dr Gill Comment note 3/23/21    Subjective   General  Chart Reviewed: Yes  Patient assessed for rehabilitation services?: Yes  Response to previous treatment: Patient with no complaints from previous session  Family / Caregiver Present: Yes(spouse)  Diagnosis: fall with Left femoral neck & distal radial fx; s/p IM nailing Left hip & Left wirst ORIF 3-22-21    Subjective  Subjective: Pt sitting up in chair, agreeable to OT treatment. Pain Assessment  Pain Assessment: Faces  Rondon-Baker Pain Rating: Hurts a little bit  Pain Location: Wrist  Pain Orientation: Left  Non-Pharmaceutical Pain Intervention(s): Ambulation/Increased Activity  Pre Treatment Pain Screening  Intervention List: Patient able to continue with treatment  Patient Currently in Pain: Yes     Orientation  Orientation  Overall Orientation Status: Within Normal Limits    Objective    ADL  Additional Comments: Pt declines need for ADLs at this time. Balance  Sitting Balance: Supervision  Standing Balance: Contact guard assistance(using platform RW)  Standing Balance  Activity: functional mobility to/from bathroom using platform RW    Functional Mobility  Functional - Mobility Device: Platform walker  Activity: To/from bathroom using platform RW  Assist Level: Contact guard assistance    Shower Transfers  Shower - Transfer From: (pt refused sitting on toilet, so she preferred sitting on shower chair using platform RW)  Shower - Transfer To:  Shower seat with back  Shower - Technique: Ambulating  Shower Transfers Comments: pt refused sitting on toilet, so she preferred sitting on shower chair using platform RW     Transfers  Sit to stand: Contact guard assistance  Stand to sit: Contact guard assistance  Cognition  Overall Cognitive Status: WFL     Type of ROM/Therapeutic Exercise  Type of ROM/Therapeutic Exercise: AROM  Comment: seated in chair  Exercises  Scapular Protraction: 20x  Scapular Retraction: 20x  Shoulder Depression: 20x  Shoulder Elevation: 20x  Horizontal ABduction: 20x  Horizontal ADduction: 20x  Elbow Flexion: 20x RUE  Elbow Extension: 20x RUE  Supination: 20x RUE  Pronation: 20x RUE  Wrist Flexion: 20x RUE  Wrist Extension: 20x RUE  Finger Flexion: 20x RUE  Finger Extension: 20x RUE     Plan   Plan  Times per week: 4-6x/ week  Current Treatment Recommendations: Strengthening, Balance Training, Functional Mobility Training, Safety Education & Training, Self-Care / ADL         AM-PAC Score  AM-PAC Inpatient Daily Activity Raw Score: 16 (03/25/21 1410)  AM-PAC Inpatient ADL T-Scale Score : 35.96 (03/25/21 1410)  ADL Inpatient CMS 0-100% Score: 53.32 (03/25/21 1410)  ADL Inpatient CMS G-Code Modifier : CK (03/25/21 1410)    Goals  Short term goals  Time Frame for Short term goals: 3-5 days(3-28-21)  Short term goal 1: min assist of 1 stand-pivot transfers by 3-26-21-- GOAL MET, pt demos stand step transfers CGA 3/24/21  Short term goal 2: min assist with UE self care by 3-28-21-- ongoing 3/25/21  Short term goal 3: CGA static standing balance for ADL's at BSC/bedside-- ongoing 3/25/21  Patient Goals   Patient goals : home when able       Therapy Time   Individual Concurrent Group Co-treatment   Time In 1121         Time Out 1149         Minutes [address] [place] [address] [address], S/DONALD    Agree with above. All information reviewed by Ulysses Andres COTA/L.

## 2021-03-25 NOTE — PLAN OF CARE
Problem: Pain:  Description: Pain management should include both nonpharmacologic and pharmacologic interventions. Goal: Control of acute pain  Description: Control of acute pain  Outcome: Ongoing  Note: Pt scoring pain on 0-10 scale. Pain medications given per MAR. Pt instructed to call out when pain level increasing. Call light within reach. Nurse will continue to reassess and monitor. Problem: Falls - Risk of:  Goal: Will remain free from falls  Description: Will remain free from falls  Outcome: Ongoing  Note: Bed in lowest position, wheels locked, 2/4 side rails up, nonskid footwear on. Bed/ chair check alarm in place, call light within reach. Pt instructed to call out when needing assistance. Pt stated understanding. Nurse will continue to monitor.

## 2021-03-26 LAB
ANION GAP SERPL CALCULATED.3IONS-SCNC: 8 MMOL/L (ref 3–16)
BUN BLDV-MCNC: 5 MG/DL (ref 7–20)
CALCIUM SERPL-MCNC: 9 MG/DL (ref 8.3–10.6)
CHLORIDE BLD-SCNC: 92 MMOL/L (ref 99–110)
CO2: 28 MMOL/L (ref 21–32)
CREAT SERPL-MCNC: <0.5 MG/DL (ref 0.6–1.2)
GFR AFRICAN AMERICAN: >60
GFR NON-AFRICAN AMERICAN: >60
GLUCOSE BLD-MCNC: 193 MG/DL (ref 70–99)
GLUCOSE BLD-MCNC: 209 MG/DL (ref 70–99)
GLUCOSE BLD-MCNC: 221 MG/DL (ref 70–99)
GLUCOSE BLD-MCNC: 222 MG/DL (ref 70–99)
GLUCOSE BLD-MCNC: 232 MG/DL (ref 70–99)
GLUCOSE BLD-MCNC: 302 MG/DL (ref 70–99)
PERFORMED ON: ABNORMAL
POTASSIUM SERPL-SCNC: 4.2 MMOL/L (ref 3.5–5.1)
SODIUM BLD-SCNC: 128 MMOL/L (ref 136–145)

## 2021-03-26 PROCEDURE — 6360000002 HC RX W HCPCS: Performed by: ORTHOPAEDIC SURGERY

## 2021-03-26 PROCEDURE — 6370000000 HC RX 637 (ALT 250 FOR IP): Performed by: ORTHOPAEDIC SURGERY

## 2021-03-26 PROCEDURE — 97110 THERAPEUTIC EXERCISES: CPT

## 2021-03-26 PROCEDURE — 6370000000 HC RX 637 (ALT 250 FOR IP): Performed by: HOSPITALIST

## 2021-03-26 PROCEDURE — 97530 THERAPEUTIC ACTIVITIES: CPT

## 2021-03-26 PROCEDURE — 97116 GAIT TRAINING THERAPY: CPT

## 2021-03-26 PROCEDURE — 97535 SELF CARE MNGMENT TRAINING: CPT

## 2021-03-26 PROCEDURE — 1200000000 HC SEMI PRIVATE

## 2021-03-26 PROCEDURE — 36415 COLL VENOUS BLD VENIPUNCTURE: CPT

## 2021-03-26 PROCEDURE — 80048 BASIC METABOLIC PNL TOTAL CA: CPT

## 2021-03-26 PROCEDURE — 6370000000 HC RX 637 (ALT 250 FOR IP): Performed by: PHYSICIAN ASSISTANT

## 2021-03-26 PROCEDURE — 2580000003 HC RX 258: Performed by: ORTHOPAEDIC SURGERY

## 2021-03-26 PROCEDURE — 6370000000 HC RX 637 (ALT 250 FOR IP): Performed by: INTERNAL MEDICINE

## 2021-03-26 RX ORDER — INSULIN GLARGINE 100 [IU]/ML
30 INJECTION, SOLUTION SUBCUTANEOUS NIGHTLY
Status: DISCONTINUED | OUTPATIENT
Start: 2021-03-26 | End: 2021-03-27 | Stop reason: HOSPADM

## 2021-03-26 RX ADMIN — ENOXAPARIN SODIUM 40 MG: 40 INJECTION SUBCUTANEOUS at 08:22

## 2021-03-26 RX ADMIN — Medication 1 G: at 08:20

## 2021-03-26 RX ADMIN — DOCUSATE SODIUM 50 MG AND SENNOSIDES 8.6 MG 1 TABLET: 8.6; 5 TABLET, FILM COATED ORAL at 08:20

## 2021-03-26 RX ADMIN — INSULIN GLARGINE 30 UNITS: 100 INJECTION, SOLUTION SUBCUTANEOUS at 20:10

## 2021-03-26 RX ADMIN — ESCITALOPRAM OXALATE 10 MG: 10 TABLET ORAL at 08:21

## 2021-03-26 RX ADMIN — INSULIN LISPRO 2 UNITS: 100 INJECTION, SOLUTION INTRAVENOUS; SUBCUTANEOUS at 17:26

## 2021-03-26 RX ADMIN — SODIUM CHLORIDE, PRESERVATIVE FREE 10 ML: 5 INJECTION INTRAVENOUS at 20:03

## 2021-03-26 RX ADMIN — INSULIN LISPRO 2 UNITS: 100 INJECTION, SOLUTION INTRAVENOUS; SUBCUTANEOUS at 20:10

## 2021-03-26 RX ADMIN — Medication 1 G: at 17:08

## 2021-03-26 RX ADMIN — FAMOTIDINE 20 MG: 20 TABLET ORAL at 20:02

## 2021-03-26 RX ADMIN — Medication 15 G: at 18:04

## 2021-03-26 RX ADMIN — Medication 1 G: at 12:16

## 2021-03-26 RX ADMIN — FAMOTIDINE 20 MG: 20 TABLET ORAL at 08:20

## 2021-03-26 RX ADMIN — DOCUSATE SODIUM 50 MG AND SENNOSIDES 8.6 MG 1 TABLET: 8.6; 5 TABLET, FILM COATED ORAL at 20:02

## 2021-03-26 RX ADMIN — SODIUM CHLORIDE, PRESERVATIVE FREE 10 ML: 5 INJECTION INTRAVENOUS at 08:23

## 2021-03-26 RX ADMIN — OXYCODONE HYDROCHLORIDE 5 MG: 5 TABLET ORAL at 21:48

## 2021-03-26 RX ADMIN — Medication 15 G: at 12:15

## 2021-03-26 RX ADMIN — INSULIN LISPRO 4 UNITS: 100 INJECTION, SOLUTION INTRAVENOUS; SUBCUTANEOUS at 08:24

## 2021-03-26 RX ADMIN — INSULIN LISPRO 4 UNITS: 100 INJECTION, SOLUTION INTRAVENOUS; SUBCUTANEOUS at 12:17

## 2021-03-26 RX ADMIN — AMLODIPINE BESYLATE 5 MG: 5 TABLET ORAL at 20:02

## 2021-03-26 RX ADMIN — GABAPENTIN 100 MG: 100 CAPSULE ORAL at 20:02

## 2021-03-26 RX ADMIN — OXYCODONE HYDROCHLORIDE 5 MG: 5 TABLET ORAL at 08:20

## 2021-03-26 RX ADMIN — ATENOLOL 25 MG: 25 TABLET ORAL at 08:20

## 2021-03-26 RX ADMIN — GABAPENTIN 100 MG: 100 CAPSULE ORAL at 08:20

## 2021-03-26 ASSESSMENT — PAIN SCALES - GENERAL
PAINLEVEL_OUTOF10: 3
PAINLEVEL_OUTOF10: 0
PAINLEVEL_OUTOF10: 3
PAINLEVEL_OUTOF10: 3
PAINLEVEL_OUTOF10: 0
PAINLEVEL_OUTOF10: 0

## 2021-03-26 ASSESSMENT — PAIN SCALES - WONG BAKER: WONGBAKER_NUMERICALRESPONSE: 2

## 2021-03-26 ASSESSMENT — PAIN DESCRIPTION - LOCATION: LOCATION: WRIST

## 2021-03-26 ASSESSMENT — PAIN DESCRIPTION - ORIENTATION: ORIENTATION: LEFT

## 2021-03-26 NOTE — CARE COORDINATION
Addendum:  BMP ordered. CM will assist w/d/c if labs stable. Otf Hicks RN        Pt has DME and [facility] able to accept pt. Pt kept r/t low Na. No labs ordered this a.m.  CM paged hospitalist for labs and will continue to follow for needs and will assist w/additional d/c needs as they arise.   Otf Hicks RN Strong peripheral pulses

## 2021-03-26 NOTE — PROGRESS NOTES
Physical Therapy  Facility/Department: [place] [address] - MED SURG/ORTHO  Daily Treatment Note  NAME: Juan Escobedo  : 1960  MRN: [id number]    Date of Service: 3/26/2021    Discharge Recommendations:  24 hour supervision or assist, Home with Home health PT   PT Equipment Recommendations  Equipment Needed: Yes  Other: LUE Platform rolling walker. If pt is unable to be seen after this session, please let this note serve as discharge summary. Please see case management note for discharge disposition. Thank you. Assessment   Body structures, Functions, Activity limitations: Decreased functional mobility ; Decreased ROM; Decreased strength;Decreased balance;Decreased endurance; Increased pain  Assessment: Pt porgressing nicely with PT today needing CGA for all transfers and ambulation 25'  using platform walker and observing 50% LLE. Pt is reocmmended for home D/.C with 24 hr A form spouse and HHPT. Treatment Diagnosis: Impaired mobility  Prognosis: Good  Decision Making: Medium Complexity  Patient Education: Pt was educated regarding proper transfer technique in order to maintain various precautions - verbalized understanding, will benefit from reinforcement. REQUIRES PT FOLLOW UP: Yes  Activity Tolerance  Activity Tolerance: Patient Tolerated treatment well  Activity Tolerance: /74  97 BPM  95% on RA     Patient Diagnosis(es): The encounter diagnosis was Closed displaced intertrochanteric fracture of left femur, initial encounter ([facility] [address] has a past medical history of Hypertension and Type II or unspecified type diabetes mellitus without mention of complication, not stated as uncontrolled. has a past surgical history that includes Femur fracture surgery (Left, 3/22/2021) and Wrist fracture surgery (Left, 3/22/2021).     Restrictions  Restrictions/Precautions  Restrictions/Precautions: Weight Bearing, General Precautions, Fall Risk  Required Braces or Orthoses?: No  Lower Extremity Weight Bearing Restrictions  Left Lower Extremity Weight Bearing: Partial Weight Bearing  Partial Weight Bearing Percentage Or Pounds: 50 %  Upper Extremity Weight Bearing Restrictions  Left Upper Extremity Weight Bearing: Weight Bearing As Tolerated  Position Activity Restriction  Other position/activity restrictions: \"50% weightbearing only in the leftlower extremity and weightbearing as tolerated in the elbow using a platform walker for six weeks. The patient will start range of motion of the wrist in two weeks. \" Per Dr Al Post note 3/23/21  Subjective   General  Chart Reviewed: Yes  Response To Previous Treatment: Patient with no complaints from previous session. Family / Caregiver Present: No  Referring Practitioner: Cynthia Prado MD  Subjective  Subjective: Pt pleasant and agreeable to PT. General Comment  Comments: RN clears for therapy  Pain Screening  Patient Currently in Pain: Yes  Pain Assessment  Pain Assessment: Faces  Rondon-Baker Pain Rating: Hurts a little bit  Non-Pharmaceutical Pain Intervention(s): Ambulation/Increased Activity;Repositioned  Vital Signs  Patient Currently in Pain: Yes       Orientation  Orientation  Overall Orientation Status: Within Normal Limits  Cognition      Objective   Bed mobility  Supine to Sit: Unable to assess  Transfers  Sit to Stand: Contact guard assistance  Stand to sit: Contact guard assistance  Ambulation  Ambulation?: Yes  WB Status: 50% weight bearing LLE, WBAT LUE with platform walker.   Ambulation 1  Surface: level tile  Device: Rolling Walker(w/ L platform attached)  Assistance: Contact guard assistance  Quality of Gait: demos improved turning and backing up  Gait Deviations: Decreased step length;Decreased step height  Distance: 25' x 2     Balance  Posture: Good  Sitting - Static: Good  Sitting - Dynamic: Good  Standing - Static: Good;-  Standing - Dynamic: Fair  Exercises  Quad Sets: x10 B  Gluteal Sets: x10 B  Knee Long Arc Quad: x10 LLE AAROM  Ankle Pumps: x10 B

## 2021-03-26 NOTE — PROGRESS NOTES
Nephrology progress  Note                                                                                                                                                                                                                                                                                                                                                               Office : [phone]     Fax :[phone]              Patient's Name: Josh Howell  3:02 PM  3/26/2021    Reason for Consult:  Hyponatremia      Requesting Physician:  Souleymane Ayala MD      Chief Complaint:  Hip fracture    History of Present Ilness:    Josh Howell is a 57 y.o. female with PMH of DM type 2, HTN presented to ED after she had syncope episode  She is left sided hip , wrist fracture       Nephrology consult for hyponatremia management    Per patient she drinks enough water     She took NSAIDs since last few days    Not on diuretics       Interval history      Serum Na 128  Uosm 484      S/p hip Post surgery  Sitting in chair  IV line not working since yesterday noon , so did not get NS IVF          Past Medical History:   Diagnosis Date    Hypertension     Type II or unspecified type diabetes mellitus without mention of complication, not stated as uncontrolled        Past Surgical History:   Procedure Laterality Date    FEMUR FRACTURE SURGERY Left 3/22/2021    LEFT GAMMA NAIL INSERTION LAURA performed by Tony Aviles MD at [facility] Left 3/22/2021    LEFT WRIST OPEN REDUCTION INTERNAL FIXATION performed by Tony Aviles MD at [facility] reviewed. No pertinent family history. reports that she has never smoked. She has never used smokeless tobacco. She reports that she does not drink alcohol.     Allergies:  Ace inhibitors    Current Medications:    insulin glargine (LANTUS) injection vial 30 Units, Nightly  urea (URE-NA) packet 15 g, BID  sodium chloride tablet 1 g, TID WC  oxyCODONE (ROXICODONE) immediate release tablet 5 mg, Q4H PRN  morphine (PF) injection 2 mg, Q4H PRN  sodium chloride flush 0.9 % injection 10 mL, 2 times per day  sodium chloride flush 0.9 % injection 10 mL, PRN  sennosides-docusate sodium (SENOKOT-S) 8.6-50 MG tablet 1 tablet, BID  magnesium hydroxide (MILK OF MAGNESIA) 400 MG/5ML suspension 30 mL, Daily PRN  enoxaparin (LOVENOX) injection 40 mg, Daily  escitalopram (LEXAPRO) tablet 10 mg, Daily  famotidine (PEPCID) tablet 20 mg, BID  gabapentin (NEURONTIN) capsule 100 mg, BID  atenolol (TENORMIN) tablet 25 mg, Daily  amLODIPine (NORVASC) tablet 5 mg, Daily  insulin lispro (HUMALOG) injection vial 0-12 Units, TID WC  insulin lispro (HUMALOG) injection vial 0-6 Units, Nightly  glucose (GLUTOSE) 40 % oral gel 15 g, PRN  dextrose 50 % IV solution, PRN  glucagon (rDNA) injection 1 mg, PRN  dextrose 5 % solution, PRN  sodium chloride flush 0.9 % injection 10 mL, 2 times per day  sodium chloride flush 0.9 % injection 10 mL, PRN  potassium chloride (KLOR-CON M) extended release tablet 40 mEq, PRN    Or  potassium bicarb-citric acid (EFFER-K) effervescent tablet 40 mEq, PRN    Or  potassium chloride 10 mEq/100 mL IVPB (Peripheral Line), PRN  magnesium sulfate 2000 mg in 50 mL IVPB premix, PRN  senna (SENOKOT) tablet 8.6 mg, Daily PRN  acetaminophen (TYLENOL) tablet 650 mg, Q6H PRN    Or  acetaminophen (TYLENOL) suppository 650 mg, Q6H PRN  ondansetron (ZOFRAN-ODT) disintegrating tablet 4 mg, Q8H PRN    Or  ondansetron (ZOFRAN) injection 4 mg, Q6H PRN        Review of Systems:   14 point ROS obtained but were negative except mentioned in HPI      Physical exam:     Vitals:  /71   Pulse 68   Temp 98 °F (36.7 °C) (Oral)   Resp 16   Ht 5' 2\" (1.575 m)   Wt 208 lb 12.8 oz (94.7 kg)   SpO2 100%   BMI 38.19 kg/m²   Constitutional:  OAA X3 NAD  Skin: no rash, turgor wnl  Heent:  eomi, mmm  Neck: no bruits or jvd noted  Cardiovascular:  S1, S2 without m/r/g Respiratory: CTA B without w/r/r  Abdomen:  +bs, soft, nt, nd  Ext:  Left hip tender    Psychiatric: mood and affect appropriate  Musculoskeletal:  Rom, muscular strength intact    Data:   Labs:  CBC:   Recent Labs     03/24/21  0936 03/25/21  0906   WBC 5.7 5.9   HGB 9.2* 9.2*   * 119*     BMP:    Recent Labs     03/24/21  0936 03/25/21  0906 03/26/21  0941   * 126* 128*   K 4.0 4.2 4.2   CL 93* 91* 92*   CO2 27 27 28   BUN 7 6* 5*   CREATININE <0.5* <0.5* <0.5*   GLUCOSE 277* 310* 302*     Ca/Mg/Phos:   Recent Labs     03/24/21  0936 03/25/21  0906 03/26/21  0941   CALCIUM 8.8 8.8 9.0     Hepatic:   No results for input(s): AST, ALT, ALB, BILITOT, ALKPHOS in the last 72 hours. Troponin: No results for input(s): TROPONINI in the last 72 hours. BNP: No results for input(s): BNP in the last 72 hours. Lipids: No results for input(s): CHOL, TRIG, HDL, LDLCALC, LABVLDL in the last 72 hours. ABGs: No results for input(s): PHART, PO2ART, NSM6WQF in the last 72 hours. INR:   No results for input(s): INR in the last 72 hours. UA:No results for input(s): Jennett Monday, GLUCOSEU, BILIRUBINUR, Benny Ferries, BLOODU, PHUR, PROTEINU, UROBILINOGEN, NITRU, LEUKOCYTESUR, LABMICR, URINETYPE in the last 72 hours. Urine Microscopic: No results for input(s): LABCAST, BACTERIA, COMU, HYALCAST, WBCUA, RBCUA, EPIU in the last 72 hours. Urine Culture: No results for input(s): LABURIN in the last 72 hours. Urine Chemistry:   Recent Labs     03/25/21  1421   LABCREA 126.8   NAUR 37             IMAGING:  XR FEMUR LEFT (MIN 2 VIEWS)   Final Result   Intraoperative spot films as above         XR CHEST PORTABLE   Final Result   Clear lungs. Cardiomegaly. No acute cardiopulmonary abnormality. Assessment/Plan       1.   Hyponatremia        Etiology : could be volume depletion           Took NSAIDs recently         Urine Osm  476 , Urine Na < 20 on 3/22         Serum Na trend : 133 -----> 125 ----> 130---> 126         Plan     Uosm 484 , serum Uric acid 3.3 points towards SIADH     will give salt tab 1 gram tid     add urea powder 15 g bid     total fluid restriction 1200 ml/day        2. Hip fracture          Ortho following    3. DM type 2     4.  Acid- base/ Electrolyte imbalance : bambi                    Thank you for allowing us to participate in care of [facility] free to contact me   [facility] [address]  Office : [phone]  Fax :[phone]

## 2021-03-26 NOTE — PROGRESS NOTES
Hospitalist Progress Note      PCP: Kira Hidalgo MD    Date of Admission: 3/21/2021    Hospital Course: \"57 y.o. female [facility], wrist, and foot pain s/p syncope and collapse 3 days PTA. She had syncopal episode associated with dizziness. The patient admits that she drinks approximately 4+ liters of water daily despite recommendations for fluid restriction by physicians. She was initially evaluated [facility] at which time she was diagnosed with left wrist fracture but told that she had no evidence of hip fracture. She was then reevaluated at [facility] ED at which time she does have minimally displaced intertrochanteric fracture of her left hip. Labs were obtained and notable for moderate hyponatremia at 125, thrombocytopenia, and acute hyperglycemia due to uncontrolled diabetes. She was transferred to [facility] for anticipated surgical intervention by orthopedics team.\"       Subjective:   No complaints. No chest pain. No n/v.    Medications:  Reviewed    Infusion Medications    dextrose       Scheduled Medications    insulin glargine  30 Units Subcutaneous Nightly    urea  15 g Oral BID    sodium chloride  1 g Oral TID WC    sodium chloride flush  10 mL Intravenous 2 times per day    sennosides-docusate sodium  1 tablet Oral BID    enoxaparin  40 mg Subcutaneous Daily    escitalopram  10 mg Oral Daily    famotidine  20 mg Oral BID    gabapentin  100 mg Oral BID    atenolol  25 mg Oral Daily    amLODIPine  5 mg Oral Daily    insulin lispro  0-12 Units Subcutaneous TID WC    insulin lispro  0-6 Units Subcutaneous Nightly    sodium chloride flush  10 mL Intravenous 2 times per day     PRN Meds: oxyCODONE, morphine, sodium chloride flush, magnesium hydroxide, glucose, dextrose, glucagon (rDNA), dextrose, sodium chloride flush, potassium chloride **OR** potassium alternative oral replacement **OR** potassium chloride, magnesium sulfate, senna, acetaminophen **OR** acetaminophen, ondansetron **OR** ondansetron      Intake/Output Summary (Last 24 hours) at 3/26/2021 1538  Last data filed at 3/26/2021 0421  Gross per 24 hour   Intake 240 ml   Output 3225 ml   Net -2985 ml       Physical Exam Performed:    /71   Pulse 68   Temp 98 °F (36.7 °C) (Oral)   Resp 16   Ht 5' 2\" (1.575 m)   Wt 208 lb 12.8 oz (94.7 kg)   SpO2 100%   BMI 38.19 kg/m²     General appearance: No apparent distress, appears stated age and cooperative. HEENT: Pupils equal, round, and reactive to light. Conjunctivae/corneas clear. Neck: Supple, with full range of motion. No jugular venous distention. Trachea midline. Respiratory:  Normal respiratory effort. Clear to auscultation, bilaterally without Rales/Wheezes/Rhonchi. Cardiovascular: Regular rate and rhythm with normal S1/S2 without murmurs, rubs or gallops. Abdomen: Soft, non-tender, non-distended with normal bowel sounds. Musculoskeletal: No clubbing, cyanosis or edema bilaterally. Left hip dressing and wrist dressing. Skin: Skin color, texture, turgor normal.  No rashes or lesions. Neurologic:  Neurovascularly intact without any focal sensory/motor deficits. Cranial nerves: II-XII intact, grossly non-focal.  Psychiatric: Alert and oriented, thought content appropriate, normal insight      Labs:   Recent Labs     03/24/21  0936 03/25/21  0906   WBC 5.7 5.9   HGB 9.2* 9.2*   HCT 27.2* 26.9*   * 119*     Recent Labs     03/24/21  0936 03/25/21  0906 03/26/21  0941   * 126* 128*   K 4.0 4.2 4.2   CL 93* 91* 92*   CO2 27 27 28   BUN 7 6* 5*   CREATININE <0.5* <0.5* <0.5*   CALCIUM 8.8 8.8 9.0     No results for input(s): AST, ALT, BILIDIR, BILITOT, ALKPHOS in the last 72 hours. No results for input(s): INR in the last 72 hours. No results for input(s): Saurabh Ripper in the last 72 hours.     Urinalysis:    No results found for: NITRU, [address], BACTERIA, RBCUA, Barrington Arce [facility]    Radiology:  XR FEMUR LEFT (MIN 2 VIEWS)   Final Result   Intraoperative spot films as above         XR CHEST PORTABLE   Final Result   Clear lungs. Cardiomegaly. No acute cardiopulmonary abnormality. Assessment/Plan:    Active Hospital Problems    Diagnosis    Closed displaced intertrochanteric fracture of left femur ([facility] [address] [S72.142A]    Retained orthopedic hardware [Z96.9]    Syncope [R55]    Hyponatremia [E87.1]    Polydipsia [R63.1]    Acute hyperglycemia [R73.9]    DM (diabetes mellitus), type 2, uncontrolled ([facility] [address] [E11.65]    Closed fracture of distal end of left radius [S52.502A]    Thrombocytopenia (HCC) [D69.6]    Peripheral neuropathy [G62.9]    Essential hypertension [I10]        Left femoral neck fracture/left distal radius fracture  S/p left wrist ORIF and left hip nail on 3/22/21  -Pain control  -PT/OT  -Management as per orthopedic surgery     Syncope and collapse-possibly due to hyponatremia  Fall precautions in place  Echo shows EF 55-60%,mild LVH,grade 2 diastolic dysfunction,Moderate Pulm HTN. She had Cardiology consultation but declined and would like to talk to PCP on discharge.  -Telemetry    Left eye hematoma  -Conservative management     Hyponatremia-Osmolar studies consistent with SIADH. Started on salt tablets  Nephrology managing     Uncontrolled type II DM with acute hyperglycemia  A1c 5.5  Glucose still high. Increase lantus to 30 units qhs and continue medium coverage scale     Periorbital hematoma status post head contusion/trauma  Patient reports head CT performed at initial evaluation and noted to be negative for acute abnormality  Fall parameters in place    DVT Prophylaxis: Lovenox   Diet: DIET GENERAL; Carb Control: 4 carb choices (60 gms)/meal; No Added Salt (3-4 GM);  Daily Fluid Restriction: 1200 ml  Dietary Nutrition Supplements: Diabetic Oral Supplement  Code Status: Full Code    PT/OT Eval Status: Ordered Dispo -DC home with home health when sodium is improved. Possible tomorrow. Pt now has pending medicaid number    Noé Salinas MD

## 2021-03-26 NOTE — PROGRESS NOTES
Occupational Therapy  Facility/Department: [facility] C5 - MED SURG/ORTHO  Daily Treatment Note  NAME: Rosette Nicholson  : 1960  MRN: [id number]    Date of Service: 3/26/2021    Discharge Recommendations:  24 hour supervision or assist, Home with Home health OT     Assessment   Performance deficits / Impairments: Decreased functional mobility ; Decreased ADL status; Decreased balance;Decreased high-level IADLs  Assessment: Pt progressing well with OT treatment. Pt CGA for grooming tasks while standing at sink. Pt requires min A for bed mobility with use of bed rail and HOB elevated. Pt continues to be CGA for functional mobility and transfers using platform RW. Pt would benefit from continuing OT treatment to address the above noted occupational performance deficits. Prognosis: Good  OT Education: OT Role;Plan of Care;Precautions; Equipment;Transfer Training;Home Exercise Program;ADL Adaptive Strategies  Patient Education: disease specific: role of OT, therex, ADL strategies and transfers  REQUIRES OT FOLLOW UP: Yes    Activity Tolerance  Activity Tolerance: Patient Tolerated treatment well  Activity Tolerance: Vitals: XH=732/74, HR=81, O2=99%  Safety Devices  Type of devices: Left in chair;Chair alarm in place;Call light within reach;Gait belt       Patient Diagnosis(es): The encounter diagnosis was Closed displaced intertrochanteric fracture of left femur, initial encounter ([facility] [address] has a past medical history of Hypertension and Type II or unspecified type diabetes mellitus without mention of complication, not stated as uncontrolled. has a past surgical history that includes Femur fracture surgery (Left, 3/22/2021) and Wrist fracture surgery (Left, 3/22/2021).     Restrictions  Restrictions/Precautions  Restrictions/Precautions: Weight Bearing, General Precautions, Fall Risk  Required Braces or Orthoses?: No  Lower Extremity Weight Bearing Restrictions  Left Lower Extremity Weight Bearing: Partial Weight Bearing Partial Weight Bearing Percentage Or Pounds: 50 %  Upper Extremity Weight Bearing Restrictions  Left Upper Extremity Weight Bearing: Weight Bearing As Tolerated  Position Activity Restriction  Other position/activity restrictions: \"50% weightbearing only in the leftlower extremity and weightbearing as tolerated in the elbow using a platform walker for six weeks. The patient will start range of motion of the wrist in two weeks. \" Per Dr Terri Head note 3/23/21    Subjective   General  Chart Reviewed: Yes  Patient assessed for rehabilitation services?: Yes  Response to previous treatment: Patient with no complaints from previous session  Family / Caregiver Present: No  Diagnosis: fall with Left femoral neck & distal radial fx; s/p IM nailing Left hip & Left wirst ORIF 3-22-21    Subjective  Subjective: Pt resting in bed, agreeable to OT treatment. Pain Assessment  Pain Assessment: 0-10  Pain Level: 3  Pain Location: Wrist  Pain Orientation: Left  Non-Pharmaceutical Pain Intervention(s): Repositioned; Ambulation/Increased Activity  Pre Treatment Pain Screening  Intervention List: Patient able to continue with treatment  Patient Currently in Pain: Yes     Orientation  Orientation  Overall Orientation Status: Within Normal Limits  Objective    ADL  Grooming: Contact guard assistance(standing at sink to brush teeth and hair, use of platform RW)     Balance  Sitting Balance: Supervision  Standing Balance: Contact guard assistance(using platform RW)  Standing Balance  Activity: functional mobility to/from bathroom, standing at sink to complete grooming tasks, use of platform RW    Functional Mobility  Functional - Mobility Device: Platform walker  Activity: To/from bathroom (using platform RW)  Assist Level: Contact guard assistance    Bed mobility  Supine to Sit: Minimal assistance(to R of bed, HOB elevated and use of bed rail)  Transfers  Sit to stand: Contact guard assistance  Stand to sit: Contact guard assistance Cognition  Overall Cognitive Status: WFL     Type of ROM/Therapeutic Exercise  Type of ROM/Therapeutic Exercise: AROM  Comment: seated in chair  Exercises  Shoulder Depression: 25x  Shoulder Elevation: 25x  Shoulder Flexion: 25x  Shoulder Extension: 25x  Finger Flexion: 25x RUE  Finger Extension: 25x RUE    Plan   Plan  Times per week: 4-6x/ week  Current Treatment Recommendations: Strengthening, Balance Training, Functional Mobility Training, Safety Education & Training, Self-Care / ADL    AM-PAC Score  AM-PAC Inpatient Daily Activity Raw Score: 17 (03/26/21 0910)  AM-PAC Inpatient ADL T-Scale Score : 37.26 (03/26/21 0910)  ADL Inpatient CMS 0-100% Score: 50.11 (03/26/21 0910)  ADL Inpatient CMS G-Code Modifier : CK (03/26/21 0910)    Goals  Short term goals  Time Frame for Short term goals: 3-5 days(3-28-21)  Short term goal 1: min assist of 1 stand-pivot transfers by 3-26-21-- GOAL MET, pt demos stand step transfers CGA 3/24/21  Short term goal 2: min assist with UE self care by 3-28-21-- ongoing 3/26/21  Short term goal 3: CGA static standing balance for ADL's at BSC/bedside-- GOAL MET, pt CGA for grooming tasks while standing at [facility] 3/26/21  Patient Goals   Patient goals : home when able     Therapy Time   Individual Concurrent Group Co-treatment   Time In 0807         Time Out 0847         Minutes Anastasia Segovia 3, S/DONALD    Agree with above. All information reviewed by Denice Andres COTA/SARA

## 2021-03-26 NOTE — PLAN OF CARE
Problem: Pain:  Goal: Pain level will decrease  Description: Pain level will decrease  3/26/2021 0929 by Bowen To RN  Outcome: Ongoing     Problem: Pain:  Goal: Control of acute pain  Description: Control of acute pain  3/26/2021 0929 by Bowen To RN  Outcome: Ongoing      Problem: Falls - Risk of:  Goal: Will remain free from falls  Description: Will remain free from falls  Outcome: Ongoing     Problem: Falls - Risk of:  Goal: Absence of physical injury  Description: Absence of physical injury  Outcome: Ongoing     Problem: Skin Integrity:  Goal: Absence of new skin breakdown  Description: Absence of new skin breakdown  Outcome: Ongoing     Problem: Skin Integrity:  Goal: Risk for impaired skin integrity will decrease  Description: Risk for impaired skin integrity will decrease  Outcome: Ongoing     Problem:  Activity:  Goal: Risk for activity intolerance will decrease  Description: Risk for activity intolerance will decrease  Outcome: Ongoing     Problem: Coping:  Goal: Ability to adjust to condition or change in health will improve  Description: Ability to adjust to condition or change in health will improve  Outcome: Ongoing

## 2021-03-27 VITALS
RESPIRATION RATE: 18 BRPM | TEMPERATURE: 98.6 F | BODY MASS INDEX: 38.42 KG/M2 | DIASTOLIC BLOOD PRESSURE: 84 MMHG | SYSTOLIC BLOOD PRESSURE: 125 MMHG | WEIGHT: 208.8 LBS | HEART RATE: 75 BPM | OXYGEN SATURATION: 97 % | HEIGHT: 62 IN

## 2021-03-27 LAB
ANION GAP SERPL CALCULATED.3IONS-SCNC: 7 MMOL/L (ref 3–16)
BUN BLDV-MCNC: 13 MG/DL (ref 7–20)
CALCIUM SERPL-MCNC: 9.3 MG/DL (ref 8.3–10.6)
CHLORIDE BLD-SCNC: 95 MMOL/L (ref 99–110)
CO2: 28 MMOL/L (ref 21–32)
CREAT SERPL-MCNC: <0.5 MG/DL (ref 0.6–1.2)
GFR AFRICAN AMERICAN: >60
GFR NON-AFRICAN AMERICAN: >60
GLUCOSE BLD-MCNC: 167 MG/DL (ref 70–99)
GLUCOSE BLD-MCNC: 177 MG/DL (ref 70–99)
GLUCOSE BLD-MCNC: 200 MG/DL (ref 70–99)
PERFORMED ON: ABNORMAL
PERFORMED ON: ABNORMAL
POTASSIUM REFLEX MAGNESIUM: 3.9 MMOL/L (ref 3.5–5.1)
POTASSIUM SERPL-SCNC: 3.9 MMOL/L (ref 3.5–5.1)
SODIUM BLD-SCNC: 130 MMOL/L (ref 136–145)

## 2021-03-27 PROCEDURE — 6370000000 HC RX 637 (ALT 250 FOR IP): Performed by: ORTHOPAEDIC SURGERY

## 2021-03-27 PROCEDURE — 80048 BASIC METABOLIC PNL TOTAL CA: CPT

## 2021-03-27 PROCEDURE — 36415 COLL VENOUS BLD VENIPUNCTURE: CPT

## 2021-03-27 PROCEDURE — 6370000000 HC RX 637 (ALT 250 FOR IP): Performed by: HOSPITALIST

## 2021-03-27 PROCEDURE — 2580000003 HC RX 258: Performed by: ORTHOPAEDIC SURGERY

## 2021-03-27 PROCEDURE — 6370000000 HC RX 637 (ALT 250 FOR IP): Performed by: INTERNAL MEDICINE

## 2021-03-27 PROCEDURE — 6370000000 HC RX 637 (ALT 250 FOR IP): Performed by: PHYSICIAN ASSISTANT

## 2021-03-27 PROCEDURE — 6360000002 HC RX W HCPCS: Performed by: ORTHOPAEDIC SURGERY

## 2021-03-27 RX ORDER — SENNA AND DOCUSATE SODIUM 50; 8.6 MG/1; MG/1
1 TABLET, FILM COATED ORAL 2 TIMES DAILY
Qty: 60 TABLET | Refills: 0 | Status: SHIPPED | OUTPATIENT
Start: 2021-03-27 | End: 2021-03-31 | Stop reason: SDUPTHER

## 2021-03-27 RX ORDER — SODIUM CHLORIDE 1000 MG
1 TABLET, SOLUBLE MISCELLANEOUS 2 TIMES DAILY WITH MEALS
Qty: 60 TABLET | Refills: 0 | Status: SHIPPED | OUTPATIENT
Start: 2021-03-27 | End: 2021-03-31 | Stop reason: SDUPTHER

## 2021-03-27 RX ADMIN — Medication 1 G: at 09:06

## 2021-03-27 RX ADMIN — SODIUM CHLORIDE, PRESERVATIVE FREE 10 ML: 5 INJECTION INTRAVENOUS at 09:06

## 2021-03-27 RX ADMIN — Medication 15 G: at 11:51

## 2021-03-27 RX ADMIN — ATENOLOL 25 MG: 25 TABLET ORAL at 09:06

## 2021-03-27 RX ADMIN — OXYCODONE HYDROCHLORIDE 5 MG: 5 TABLET ORAL at 09:03

## 2021-03-27 RX ADMIN — ENOXAPARIN SODIUM 40 MG: 40 INJECTION SUBCUTANEOUS at 09:06

## 2021-03-27 RX ADMIN — INSULIN LISPRO 2 UNITS: 100 INJECTION, SOLUTION INTRAVENOUS; SUBCUTANEOUS at 09:06

## 2021-03-27 RX ADMIN — Medication 1 G: at 11:52

## 2021-03-27 RX ADMIN — ESCITALOPRAM OXALATE 10 MG: 10 TABLET ORAL at 09:06

## 2021-03-27 RX ADMIN — INSULIN LISPRO 2 UNITS: 100 INJECTION, SOLUTION INTRAVENOUS; SUBCUTANEOUS at 11:51

## 2021-03-27 RX ADMIN — FAMOTIDINE 20 MG: 20 TABLET ORAL at 09:05

## 2021-03-27 RX ADMIN — GABAPENTIN 100 MG: 100 CAPSULE ORAL at 09:26

## 2021-03-27 RX ADMIN — DOCUSATE SODIUM 50 MG AND SENNOSIDES 8.6 MG 1 TABLET: 8.6; 5 TABLET, FILM COATED ORAL at 09:06

## 2021-03-27 ASSESSMENT — PAIN DESCRIPTION - DESCRIPTORS: DESCRIPTORS: SHARP;DISCOMFORT

## 2021-03-27 ASSESSMENT — PAIN SCALES - GENERAL
PAINLEVEL_OUTOF10: 3
PAINLEVEL_OUTOF10: 3

## 2021-03-27 ASSESSMENT — PAIN DESCRIPTION - DIRECTION: RADIATING_TOWARDS: NOT

## 2021-03-27 ASSESSMENT — PAIN DESCRIPTION - LOCATION: LOCATION: ARM

## 2021-03-27 NOTE — PROGRESS NOTES
Discharge instructions were given to the patient and reviewed thoroughly with the patient. Patient denies any other needs at this time. Patient discharged in stable condition.

## 2021-03-27 NOTE — PROGRESS NOTES
Nephrology progress  Note                                                                                                                                                                                                                                                                                                                                                               Office : [phone]     Fax :[phone]              Patient's Name: Chase Lott  10:40 AM  3/27/2021    Reason for Consult:  Hyponatremia      Requesting Physician:  Sy Guido MD      Chief Complaint:  Hip fracture    History of Present Ilness:    Chase Lott is a 57 y.o. female with PMH of DM type 2, HTN presented to ED after she had syncope episode  She is left sided hip , wrist fracture       Nephrology consult for hyponatremia management    Per patient she drinks enough water     She took NSAIDs since last few days    Not on diuretics       Interval history      Serum Na 128  ----> 130   Uosm 484            Past Medical History:   Diagnosis Date    Hypertension     Type II or unspecified type diabetes mellitus without mention of complication, not stated as uncontrolled        Past Surgical History:   Procedure Laterality Date    FEMUR FRACTURE SURGERY Left 3/22/2021    LEFT GAMMA NAIL INSERTION LAURA performed by Arabella Germain MD at [facility] Left 3/22/2021    LEFT WRIST OPEN REDUCTION INTERNAL FIXATION performed by Arabella Germain MD at [place] [address] reviewed. No pertinent family history. reports that she has never smoked. She has never used smokeless tobacco. She reports that she does not drink alcohol.     Allergies:  Ace inhibitors    Current Medications:    insulin glargine (LANTUS) injection vial 30 Units, Nightly  urea (URE-NA) packet 15 g, BID  sodium chloride tablet 1 g, TID WC  oxyCODONE (ROXICODONE) immediate release tablet 5 mg, Q4H PRN  morphine (PF) injection 2 mg, Q4H PRN  sodium chloride flush 0.9 % injection 10 mL, 2 times per day  sodium chloride flush 0.9 % injection 10 mL, PRN  sennosides-docusate sodium (SENOKOT-S) 8.6-50 MG tablet 1 tablet, BID  magnesium hydroxide (MILK OF MAGNESIA) 400 MG/5ML suspension 30 mL, Daily PRN  enoxaparin (LOVENOX) injection 40 mg, Daily  escitalopram (LEXAPRO) tablet 10 mg, Daily  famotidine (PEPCID) tablet 20 mg, BID  gabapentin (NEURONTIN) capsule 100 mg, BID  atenolol (TENORMIN) tablet 25 mg, Daily  amLODIPine (NORVASC) tablet 5 mg, Daily  insulin lispro (HUMALOG) injection vial 0-12 Units, TID WC  insulin lispro (HUMALOG) injection vial 0-6 Units, Nightly  glucose (GLUTOSE) 40 % oral gel 15 g, PRN  dextrose 50 % IV solution, PRN  glucagon (rDNA) injection 1 mg, PRN  dextrose 5 % solution, PRN  sodium chloride flush 0.9 % injection 10 mL, 2 times per day  sodium chloride flush 0.9 % injection 10 mL, PRN  potassium chloride (KLOR-CON M) extended release tablet 40 mEq, PRN    Or  potassium bicarb-citric acid (EFFER-K) effervescent tablet 40 mEq, PRN    Or  potassium chloride 10 mEq/100 mL IVPB (Peripheral Line), PRN  magnesium sulfate 2000 mg in 50 mL IVPB premix, PRN  senna (SENOKOT) tablet 8.6 mg, Daily PRN  acetaminophen (TYLENOL) tablet 650 mg, Q6H PRN    Or  acetaminophen (TYLENOL) suppository 650 mg, Q6H PRN  ondansetron (ZOFRAN-ODT) disintegrating tablet 4 mg, Q8H PRN    Or  ondansetron (ZOFRAN) injection 4 mg, Q6H PRN        Review of Systems:   14 point ROS obtained but were negative except mentioned in HPI      Physical exam:     Vitals:  /84   Pulse 75   Temp 98.6 °F (37 °C) (Oral)   Resp 18   Ht 5' 2\" (1.575 m)   Wt 208 lb 12.8 oz (94.7 kg)   SpO2 97%   BMI 38.19 kg/m²   Constitutional:  OAA X3 NAD  Skin: no rash, turgor wnl  Heent:  eomi, mmm  Neck: no bruits or jvd noted  Cardiovascular:  S1, S2 without m/r/g  Respiratory: CTA B without w/r/r  Abdomen:  +bs, soft, nt, nd  Ext:  Left hip tender Psychiatric: mood and affect appropriate  Musculoskeletal:  Rom, muscular strength intact    Data:   Labs:  CBC:   Recent Labs     03/25/21  0906   WBC 5.9   HGB 9.2*   *     BMP:    Recent Labs     03/25/21  0906 03/26/21  0941 03/27/21  0523   * 128* 130*   K 4.2 4.2 3.9  3.9   CL 91* 92* 95*   CO2 27 28 28   BUN 6* 5* 13   CREATININE <0.5* <0.5* <0.5*   GLUCOSE 310* 302* 177*     Ca/Mg/Phos:   Recent Labs     03/25/21  0906 03/26/21  0941 03/27/21  0523   CALCIUM 8.8 9.0 9.3     Hepatic:   No results for input(s): AST, ALT, ALB, BILITOT, ALKPHOS in the last 72 hours. Troponin: No results for input(s): TROPONINI in the last 72 hours. BNP: No results for input(s): BNP in the last 72 hours. Lipids: No results for input(s): CHOL, TRIG, HDL, LDLCALC, LABVLDL in the last 72 hours. ABGs: No results for input(s): PHART, PO2ART, KHY7SKP in the last 72 hours. INR:   No results for input(s): INR in the last 72 hours. UA:No results for input(s): Wolm Tavon, GLUCOSEU, BILIRUBINUR, John Buerger, BLOODU, PHUR, PROTEINU, UROBILINOGEN, NITRU, LEUKOCYTESUR, LABMICR, URINETYPE in the last 72 hours. Urine Microscopic: No results for input(s): LABCAST, BACTERIA, COMU, HYALCAST, WBCUA, RBCUA, EPIU in the last 72 hours. Urine Culture: No results for input(s): LABURIN in the last 72 hours. Urine Chemistry:   Recent Labs     03/25/21  1421   LABCREA 126.8   NAUR 37             IMAGING:  XR FEMUR LEFT (MIN 2 VIEWS)   Final Result   Intraoperative spot films as above         XR CHEST PORTABLE   Final Result   Clear lungs. Cardiomegaly. No acute cardiopulmonary abnormality. Assessment/Plan       1.   Hyponatremia        Etiology : could be volume depletion           Took NSAIDs recently         Urine Osm  476 , Urine Na < 20 on 3/22         Serum Na trend : 133 -----> 125 ----> 130---> 126         Plan     Uosm 484 , serum Uric acid 3.3 points towards SIADH       continue salt tablet 1 g tid

## 2021-03-27 NOTE — DISCHARGE SUMMARY
Hospital Medicine Discharge Summary    Patient ID: Abi Ponce      Patient's PCP: Nicolette Roth MD    Admit Date: 3/21/2021     Discharge Date: 3/27/2021      Admitting Physician: Darrell Matute MD     Discharge Physician: Latasha Swann MD     Discharge Diagnoses: Active Hospital Problems    Diagnosis    Closed displaced intertrochanteric fracture of left femur ([facility] [address] [S72.142A]    Retained orthopedic hardware [Z96.9]    Syncope [R55]    Hyponatremia [E87.1]    Polydipsia [R63.1]    DM (diabetes mellitus), type 2, uncontrolled ([facility] [address] [E11.65]    Closed fracture of distal end of left radius [S52.502A]    Thrombocytopenia (HCC) [D69.6]    Peripheral neuropathy [G62.9]    Essential hypertension [I10]       The patient was seen and examined on day of discharge and this discharge summary is in conjunction with any daily progress note from day of discharge. Hospital Course:   \"57 y. o. female presented to [facility] left-sided hip, wrist, and foot pain s/p syncope and collapse 3 days PTA. Jessica Grajeda had syncopal episode associated with dizziness.    The patient admits that she drinks approximately 4+ liters of water daily despite recommendations for fluid restriction by physicians. Jessica Grajeda was initially evaluated [facility] at which time she was diagnosed with left wrist fracture but told that she had no evidence of hip fracture.  She was then reevaluated at [facility] ED at which time she does have minimally displaced intertrochanteric fracture of her left hip.  Labs were obtained and notable for moderate hyponatremia at 125, thrombocytopenia, and acute hyperglycemia due to uncontrolled diabetes. Jessica Grajeda was transferred to [facility] for anticipated surgical intervention by orthopedics team.\"     Left femoral neck fracture/left distal radius fracture  S/p left wrist ORIF and left hip nail on 3/22/21. Discharged on lovenox for anticoagulation  -Pain control was provided  -PT/OT was consulted  -Management was per Orthopedic surgery     Syncope and collapse-possibly due to hyponatremia  Fall precautions were in place  Echo showed EF 55-60%,mild LVH,grade 2 diastolic dysfunction,Moderate Pulm HTN. She had Cardiology consultation but declined and would like to talk to PCP on discharge.  -Telemetry was placed     Left eye hematoma  -Conservative management     Hyponatremia-Osmolar studies consistent with SIADH. Started on salt tablets  Nephrology was following     Uncontrolled type II DM with acute hyperglycemia  A1c 5.5  Glucose was shigh. Increased lantus to 30 units qhs and placed on medium coverage scale. Resumed oral diabetic medication.     Periorbital hematoma status post head contusion/trauma  Patient reports head CT performed at initial evaluation and noted to be negative for acute abnormality  Fall parameters in place         Physical Exam Performed:     /84   Pulse 75   Temp 98.6 °F (37 °C) (Oral)   Resp 18   Ht 5' 2\" (1.575 m)   Wt 208 lb 12.8 oz (94.7 kg)   SpO2 97%   BMI 38.19 kg/m²   General appearance: No apparent distress, appears stated age and cooperative. HEENT: Pupils equal, round, and reactive to light. Conjunctivae/corneas clear. Neck: Supple, with full range of motion. No jugular venous distention. Trachea midline. Respiratory:  Normal respiratory effort. Clear to auscultation, bilaterally without Rales/Wheezes/Rhonchi. Cardiovascular: Regular rate and rhythm with normal S1/S2 without murmurs, rubs or gallops. Abdomen: Soft, non-tender, non-distended with normal bowel sounds. Musculoskeletal: No clubbing, cyanosis or edema bilaterally. Left hip dressing and wrist dressing. Skin: Skin color, texture, turgor normal.  No rashes or lesions. Neurologic:  Neurovascularly intact without any focal sensory/motor deficits.  Cranial nerves: II-XII intact, grossly non-focal.  Psychiatric: Alert and oriented, thought content appropriate, normal insight       Labs: For convenience and continuity at follow-up the following most recent labs are provided:      CBC:    Lab Results   Component Value Date    WBC 5.9 03/25/2021    HGB 9.2 03/25/2021    HCT 26.9 03/25/2021     03/25/2021       Renal:    Lab Results   Component Value Date     03/31/2021    K 4.6 03/31/2021    K 3.9 03/27/2021    CL 99 03/31/2021    CO2 28 03/31/2021    BUN 3 03/31/2021    CREATININE 0.5 03/31/2021    CALCIUM 9.2 03/31/2021         Significant Diagnostic Studies    Radiology:   XR FEMUR LEFT (MIN 2 VIEWS)   Final Result   Intraoperative spot films as above         XR CHEST PORTABLE   Final Result   Clear lungs. Cardiomegaly. No acute cardiopulmonary abnormality.                       Consults:     IP CONSULT TO NEPHROLOGY  IP CONSULT TO SOCIAL WORK  IP CONSULT TO CARDIOLOGY  IP CONSULT TO HOME CARE NEEDS    Disposition:  Home with home health    Condition at Discharge: Stable    Discharge Instructions/Follow-up:    [address] [address]  [address] [facility], [address] [place] [address]  [place] [address]  [phone]    Schedule an appointment as soon as possible for a visit in 1 week  after hospital care    Beto Palacios MD  [address]  [phone]    Schedule an appointment as soon as possible for a visit in 2 weeks  for low sodium levels       Code Status:  Prior     Activity: activity as tolerated    Diet: diabetic diet      Discharge Medications:     Discharge Medication List as of 3/29/2021  1:46 PM           Details   sennosides-docusate sodium (SENOKOT-S) 8.6-50 MG tablet Take 1 tablet by mouth 2 times daily, Disp-60 tablet, R-0Normal      sodium chloride 1 g tablet Take 1 tablet by mouth 2 times daily (with meals), Disp-60 tablet, R-0Normal      oxyCODONE (ROXICODONE) 5 MG immediate release tablet Take 1 tablet by mouth every 6 hours as needed for Pain for up to 7 days. , Disp-28 tablet, R-0Print      enoxaparin (LOVENOX) 40 MG/0.4ML injection Inject 0.4 mLs into the skin daily, Disp-14 Syringe, R-0Print              Details   gabapentin (NEURONTIN) 100 MG capsule TAKE ONE CAPSULE BY MOUTH TWICE A DAY, Disp-60 capsule, R-0Normal      blood glucose test strips (ASCENSIA AUTODISC VI;ONE TOUCH ULTRA TEST VI) strip Disp-100 each, R-3, NormalUSE TO TEST DAILY. DX;E11.9      Blood Glucose Monitoring Suppl ([employer] BLOOD GLUCOSE KIT) KIT Disp-1 kit, R-0, NormalUSE TO TEST DAILY. DX:E11.9      oxybutynin (DITROPAN) 5 MG tablet Take 1 tablet by mouth 3 times daily, Disp-90 tablet, R-5Normal      escitalopram (LEXAPRO) 10 MG tablet Take 1 tablet by mouth daily, Disp-30 tablet, R-5Normal      metFORMIN (GLUCOPHAGE) 1000 MG tablet TAKE ONE TABLET BY MOUTH TWICE A DAY, Disp-60 tablet, R-5Normal      glyBURIDE (DIABETA) 5 MG tablet TAKE ONE TABLET BY MOUTH DAILY, Disp-30 tablet, R-5Normal      amLODIPine (NORVASC) 5 MG tablet Take 1 tablet by mouth daily, Disp-30 tablet, R-5Normal      atenolol (TENORMIN) 25 MG tablet TAKE ONE TABLET BY MOUTH DAILY, Disp-30 tablet, R-5Normal      raNITIdine (ZANTAC) 150 MG tablet TAKE ONE TABLET BY MOUTH TWICE A DAY, Disp-60 tablet, R-5Normal             Time Spent on discharge is more than 30 minutes in the examination, evaluation, counseling and review of medications and discharge plan. Signed:    Adrián Maldonado MD   4/20/2021      Thank you Kirill Almaraz MD for the opportunity to be involved in this patient's care. If you have any questions or concerns please feel free to contact me at [phone].

## 2021-03-27 NOTE — CARE COORDINATION
CASE MANAGEMENT DISCHARGE SUMMARY      Discharge to: Home with [address] ordered/agency: [facility]    Transportation:    Family/car: yes     Confirmed discharge plan with:     Patient: yes per RN     Family, name and contact number:    Facility/Agency, name: [facility]  [facility] faxed      RN, name: Dayna Min RN    Note: Discharging nurse to complete FROILAN, reconcile AVS, and place final copy with patient's discharge packet. RN to ensure that written prescriptions for  Level II medications are sent with patient to the facility as per protocol.

## 2021-03-29 ENCOUNTER — TELEPHONE (OUTPATIENT)
Dept: INTERNAL MEDICINE CLINIC | Age: 61
End: 2021-03-29

## 2021-03-29 NOTE — TELEPHONE ENCOUNTER
[facility] Transitions Initial Follow Up Call    Outreach made within 2 business days of discharge: Yes    Patient: Chadnrika Jose Patient : 1960   MRN: [id number]  Reason for Admission: There are no discharge diagnoses documented for the most recent discharge. Discharge Date: 3/27/21       Spoke with: Patient     Discharge department/facility: [facility] Interactive Patient Contact:  Was patient able to fill all prescriptions: Yes  Was patient instructed to bring all medications to the follow-up visit: Yes  Is patient taking all medications as directed in the discharge summary?  Yes  Does patient understand their discharge instructions: Yes  Does patient have questions or concerns that need addressed prior to 7-14 day follow up office visit: no    Scheduled appointment with PCP within 7-14 days    Follow Up  Future Appointments   Date Time Provider Tae Vasquez   3/31/2021 11:10 AM MD Johanny Limon Int None       Macho Yun

## 2021-03-31 ENCOUNTER — OFFICE VISIT (OUTPATIENT)
Dept: INTERNAL MEDICINE CLINIC | Age: 61
End: 2021-03-31

## 2021-03-31 VITALS
TEMPERATURE: 97.9 F | BODY MASS INDEX: 38.19 KG/M2 | SYSTOLIC BLOOD PRESSURE: 146 MMHG | HEIGHT: 62 IN | DIASTOLIC BLOOD PRESSURE: 80 MMHG | HEART RATE: 64 BPM

## 2021-03-31 DIAGNOSIS — E87.1 HYPONATREMIA: ICD-10-CM

## 2021-03-31 DIAGNOSIS — G63 POLYNEUROPATHY ASSOCIATED WITH UNDERLYING DISEASE (HCC): ICD-10-CM

## 2021-03-31 DIAGNOSIS — S52.502D CLOSED FRACTURE OF DISTAL END OF LEFT RADIUS WITH ROUTINE HEALING, UNSPECIFIED FRACTURE MORPHOLOGY, SUBSEQUENT ENCOUNTER: ICD-10-CM

## 2021-03-31 DIAGNOSIS — I10 ESSENTIAL HYPERTENSION: ICD-10-CM

## 2021-03-31 DIAGNOSIS — I27.20 PULMONARY HYPERTENSION (HCC): ICD-10-CM

## 2021-03-31 DIAGNOSIS — S72.142D CLOSED DISPLACED INTERTROCHANTERIC FRACTURE OF LEFT FEMUR WITH ROUTINE HEALING, SUBSEQUENT ENCOUNTER: ICD-10-CM

## 2021-03-31 DIAGNOSIS — E11.9 TYPE 2 DIABETES MELLITUS WITHOUT COMPLICATION, WITHOUT LONG-TERM CURRENT USE OF INSULIN (HCC): Primary | ICD-10-CM

## 2021-03-31 PROBLEM — R73.9 ACUTE HYPERGLYCEMIA: Status: RESOLVED | Noted: 2021-03-21 | Resolved: 2021-03-31

## 2021-03-31 LAB
ANION GAP SERPL CALCULATED.3IONS-SCNC: 9 MMOL/L (ref 3–16)
BUN BLDV-MCNC: 3 MG/DL (ref 7–20)
CALCIUM SERPL-MCNC: 9.2 MG/DL (ref 8.3–10.6)
CHLORIDE BLD-SCNC: 99 MMOL/L (ref 99–110)
CO2: 28 MMOL/L (ref 21–32)
CREAT SERPL-MCNC: 0.5 MG/DL (ref 0.6–1.2)
GFR AFRICAN AMERICAN: >60
GFR NON-AFRICAN AMERICAN: >60
GLUCOSE BLD-MCNC: 116 MG/DL (ref 70–99)
POTASSIUM SERPL-SCNC: 4.6 MMOL/L (ref 3.5–5.1)
SODIUM BLD-SCNC: 136 MMOL/L (ref 136–145)

## 2021-03-31 PROCEDURE — 99214 OFFICE O/P EST MOD 30 MIN: CPT | Performed by: INTERNAL MEDICINE

## 2021-03-31 RX ORDER — GLYBURIDE 5 MG/1
TABLET ORAL
Qty: 30 TABLET | Refills: 5 | Status: SHIPPED | OUTPATIENT
Start: 2021-03-31

## 2021-03-31 RX ORDER — ATENOLOL 25 MG/1
TABLET ORAL
Qty: 30 TABLET | Refills: 5 | Status: SHIPPED | OUTPATIENT
Start: 2021-03-31

## 2021-03-31 RX ORDER — OXYBUTYNIN CHLORIDE 5 MG/1
5 TABLET ORAL 3 TIMES DAILY
Qty: 90 TABLET | Refills: 5 | Status: SHIPPED | OUTPATIENT
Start: 2021-03-31

## 2021-03-31 RX ORDER — SODIUM CHLORIDE 1000 MG
1 TABLET, SOLUBLE MISCELLANEOUS 2 TIMES DAILY WITH MEALS
Qty: 60 TABLET | Refills: 0 | Status: SHIPPED | OUTPATIENT
Start: 2021-03-31 | End: 2021-04-30

## 2021-03-31 RX ORDER — SENNA AND DOCUSATE SODIUM 50; 8.6 MG/1; MG/1
1 TABLET, FILM COATED ORAL 2 TIMES DAILY
Qty: 60 TABLET | Refills: 0 | Status: SHIPPED | OUTPATIENT
Start: 2021-03-31 | End: 2021-04-30

## 2021-03-31 RX ORDER — AMLODIPINE BESYLATE 5 MG/1
5 TABLET ORAL DAILY
Qty: 30 TABLET | Refills: 5 | Status: SHIPPED | OUTPATIENT
Start: 2021-03-31

## 2021-03-31 RX ORDER — ESCITALOPRAM OXALATE 10 MG/1
10 TABLET ORAL DAILY
Qty: 30 TABLET | Refills: 5 | Status: SHIPPED | OUTPATIENT
Start: 2021-03-31

## 2021-03-31 ASSESSMENT — ENCOUNTER SYMPTOMS
WHEEZING: 0
VOMITING: 0
BLOOD IN STOOL: 0
SHORTNESS OF BREATH: 0
DIARRHEA: 0
ABDOMINAL PAIN: 0
NAUSEA: 0
CHEST TIGHTNESS: 0
COUGH: 0

## 2021-03-31 NOTE — PROGRESS NOTES
Karyn Davidson (:  1960) is a 57 y.o. female,Established patient, here for evaluation of the following chief complaint(s):  Follow-up        SUBJECTIVE/OBJECTIVE:   Diagnosis Orders   1. Type 2 diabetes mellitus without complication, without long-term current use of insulin ([facility] [address]     2. Essential hypertension  Basic Metabolic Panel   3. Hyponatremia     4. Polyneuropathy associated with underlying disease ([address]     5. Pulmonary hypertension (HCC)     6. Closed displaced intertrochanteric fracture of left femur with routine healing, subsequent encounter     7. Closed fracture of distal end of left radius with routine healing, unspecified fracture morphology, subsequent encounter             HTN. Stable. Continue current meds      DM- stable. Continue current meds. a1c is good      Peripheral neuropathy  continue gabapentin. Stable.     Depression  Stable. Continue lexapro     psoriasis   Betamethasone cream  Better now     Continue ditropan for urinary urge incontinence.     Schedule mammogram   Hemoccult x3 done - negative    Mod pulm HTN and diastolic dysfunction per ECHO  Asymptomatic       HPI     She has HTN,DM,diabetic neuropathy. Blood pressure is under good control. Blood sugars are under fair control. Takes gabapentin for neuropathy.     Mann Trent is working well for depression     Had a distal left femoral fracture  s/p internal fixation     Her psoriasis is better with the steroid cream    Admitted to [facility] for syncope,hyponatremia and left hip fracture and left wrist fracture. Underwent s/p left wrist ORIF,s/p hip nailing. ECHO- diastolic dysfunction,mod pulm HTN    Review of Systems   Constitutional: Negative for fatigue, fever and unexpected weight change. Respiratory: Negative for cough, chest tightness, shortness of breath and wheezing. Cardiovascular: Negative for chest pain, palpitations and leg swelling.    Gastrointestinal: Negative for abdominal pain, blood in stool, diarrhea, nausea and vomiting. Genitourinary: Negative for dysuria and hematuria. Neurological: Negative for light-headedness. Physical Exam  Constitutional:       Appearance: She is well-developed. HENT:      Head: Normocephalic and atraumatic. Eyes:      Pupils: Pupils are equal, round, and reactive to light. Neck:      Musculoskeletal: Normal range of motion and neck supple. Thyroid: No thyromegaly. Cardiovascular:      Rate and Rhythm: Normal rate and regular rhythm. Heart sounds: Normal heart sounds. No murmur. No friction rub. No gallop. Comments: No carotid bruit  Pulmonary:      Effort: Pulmonary effort is normal. No respiratory distress. Breath sounds: Normal breath sounds. No wheezing or rales. Chest:      Chest wall: No tenderness. Abdominal:      General: Bowel sounds are normal. There is no distension. Palpations: Abdomen is soft. There is no mass. Tenderness: There is no abdominal tenderness. There is no guarding or rebound. Neurological:      Mental Status: She is alert and oriented to person, place, and time. An electronic signature was used to authenticate this note.     --Tony Contreras MD

## 2021-04-01 ENCOUNTER — TELEPHONE (OUTPATIENT)
Dept: ORTHOPEDIC SURGERY | Age: 61
End: 2021-04-01

## 2021-04-01 NOTE — TELEPHONE ENCOUNTER
Patient does not have an appt until 4-13. They were under the impression that she needed to be see sooner. Wants to make sure this appt is ok or does she need to be seen sooner? Please call the patient back if this needs changed. Notify the homecare agency also.

## 2021-04-02 DIAGNOSIS — G63 POLYNEUROPATHY ASSOCIATED WITH UNDERLYING DISEASE (HCC): ICD-10-CM

## 2021-04-02 RX ORDER — GABAPENTIN 100 MG/1
CAPSULE ORAL
Qty: 60 CAPSULE | Refills: 0 | Status: SHIPPED | OUTPATIENT
Start: 2021-04-03 | End: 2021-05-06

## 2021-04-02 RX ORDER — FAMOTIDINE 20 MG/1
TABLET, FILM COATED ORAL
Qty: 60 TABLET | Refills: 2 | Status: SHIPPED | OUTPATIENT
Start: 2021-04-02

## 2021-04-06 ENCOUNTER — OFFICE VISIT (OUTPATIENT)
Dept: ORTHOPEDIC SURGERY | Age: 61
End: 2021-04-06
Payer: MEDICAID

## 2021-04-06 ENCOUNTER — TELEPHONE (OUTPATIENT)
Dept: ORTHOPEDIC SURGERY | Age: 61
End: 2021-04-06

## 2021-04-06 VITALS — WEIGHT: 208 LBS | TEMPERATURE: 98.1 F | BODY MASS INDEX: 38.28 KG/M2 | HEIGHT: 62 IN

## 2021-04-06 DIAGNOSIS — S72.142A CLOSED DISPLACED INTERTROCHANTERIC FRACTURE OF LEFT FEMUR, INITIAL ENCOUNTER (HCC): ICD-10-CM

## 2021-04-06 DIAGNOSIS — S52.502A CLOSED FRACTURE OF DISTAL END OF LEFT RADIUS, UNSPECIFIED FRACTURE MORPHOLOGY, INITIAL ENCOUNTER: Primary | ICD-10-CM

## 2021-04-06 PROCEDURE — 99024 POSTOP FOLLOW-UP VISIT: CPT | Performed by: NURSE PRACTITIONER

## 2021-04-06 PROCEDURE — L3908 WHO COCK-UP NONMOLDE PRE OTS: HCPCS | Performed by: NURSE PRACTITIONER

## 2021-04-06 PROCEDURE — APPNB30 APP NON BILLABLE TIME 0-30 MINS: Performed by: NURSE PRACTITIONER

## 2021-04-06 NOTE — PROGRESS NOTES
loosening, or hardware failure. IMPRESSION:  2 weeks out from   1-left distal radius ORIF   2-left supracondylar femur comminuted fracture ORIF with a locking plate    PLAN: For the wrist: I have told the patient to work on ROM, as well as strengthening exercises. A removable forearm brace applied. No heavy impact activities. She can use a platform walker with her wrist brace on. The patient will come back for a follow up in 6 weeks. At that time, we will take 3 views of the left wrist. PT if needed then. For the hip: I have told the patient to work on ROM with  PT, 50% WB for 6 weeks as well as strengthening exercises. She can use a platform walker with her brace on. The patient will come back for a follow up in 6 weeks. At that time, we will take xray 4 views left knee. As this patient has demonstrated risk factors for osteoporosis, such as age greater than [de-identified] years and evidence of a fracture, I have referred the patient back to the primary care physician for evaluation for osteoporosis, including consideration for DEXA scanning, if this is felt to be clinically indicated. The patient is advised to contact the primary care physician to follow-up for further evaluation. Procedures    Lindsay Magana Titan Wrist and Forearm Brace     Patient was prescribed a Lindsay Magana Wrist and Forearm Brace. The left hand will require stabilization / immobilization from this semi-rigid / rigid orthosis to improve their function. The orthosis will assist in protecting the affected area, provide functional support and facilitate healing. The patient was educated and fit by a healthcare professional with expert knowledge and specialization in brace application while under the direct supervision of the physician. Verbal and written instructions for the use of and application of this item were provided.    They were instructed to contact the office immediately should the brace result in increased pain, decreased sensation, increased swelling or worsening of the condition.          Moiz Martínez, APRN - CNP

## 2021-04-07 ENCOUNTER — TELEPHONE (OUTPATIENT)
Dept: ORTHOPEDIC SURGERY | Age: 61
End: 2021-04-07

## 2021-04-07 NOTE — TELEPHONE ENCOUNTER
LVM for [facility] letting her know that I returned her call. Per Cheri 30 last note patient is to be working on gentle ROM and strengthening exercises for the wrist. No heavy impact activities such as running jumping pushing or pulling.

## 2021-04-09 ENCOUNTER — TELEPHONE (OUTPATIENT)
Dept: ORTHOPEDIC SURGERY | Age: 61
End: 2021-04-09

## 2021-05-06 DIAGNOSIS — G63 POLYNEUROPATHY ASSOCIATED WITH UNDERLYING DISEASE (HCC): ICD-10-CM

## 2021-05-06 RX ORDER — GABAPENTIN 100 MG/1
CAPSULE ORAL
Qty: 60 CAPSULE | Refills: 0 | Status: SHIPPED | OUTPATIENT
Start: 2021-05-06 | End: 2021-06-01

## 2021-05-18 ENCOUNTER — OFFICE VISIT (OUTPATIENT)
Dept: ORTHOPEDIC SURGERY | Age: 61
End: 2021-05-18

## 2021-05-18 VITALS — HEIGHT: 62 IN | WEIGHT: 208 LBS | BODY MASS INDEX: 38.28 KG/M2

## 2021-05-18 DIAGNOSIS — S72.142A CLOSED DISPLACED INTERTROCHANTERIC FRACTURE OF LEFT FEMUR, INITIAL ENCOUNTER (HCC): ICD-10-CM

## 2021-05-18 DIAGNOSIS — S52.502A CLOSED FRACTURE OF DISTAL END OF LEFT RADIUS, UNSPECIFIED FRACTURE MORPHOLOGY, INITIAL ENCOUNTER: Primary | ICD-10-CM

## 2021-05-18 PROCEDURE — 99024 POSTOP FOLLOW-UP VISIT: CPT | Performed by: ORTHOPAEDIC SURGERY

## 2021-05-18 NOTE — LETTER
[address] [place] [address] Dr [address] [place] [address]  Phone: [phone]  Fax: [phone] Nikki Nguyen MD         May 18, 2021     Patient: Mariah Montiel   YOB: 1960   Date of Visit: 5/18/2021       To Whom It May Concern: It is my medical opinion that Mariah Montiel requires a disability parking placard for the following reasons:  She has limited walking ability due to an orthopedic condition. Duration of need: 1 year    If you have any questions or concerns, please don't hesitate to call.     Sincerely,        Dariana Stout MD

## 2021-05-24 NOTE — PROGRESS NOTES
position, no loosening, or hardware failure. IMPRESSION:  2 months out from   1-Left distal radius ORIF   2-Left supracondylar femur comminuted fracture ORIF with a locking plate    PLAN: For the wrist: I have told the patient to work on ROM, as well as strengthening exercises. D/c removable forearm brace. No heavy impact activities. She can d/c platform walker for her wrist.  The patient will come back for a follow up in 6 weeks. At that time, we will take 3 views of the left wrist. PT if needed then. For the hip: I have told the patient to work on ROM with  PT, WBAT as well as strengthening exercises. The patient will come back for a follow up in 6 weeks. At that time, we will take xray 4 views left knee. As this patient has demonstrated risk factors for osteoporosis, such as age greater than [de-identified] years and evidence of a fracture, I have referred the patient back to the primary care physician for evaluation for osteoporosis, including consideration for DEXA scanning, if this is felt to be clinically indicated. The patient is advised to contact the primary care physician to follow-up for further evaluation.        Sanket Conley MD

## 2021-06-01 DIAGNOSIS — G63 POLYNEUROPATHY ASSOCIATED WITH UNDERLYING DISEASE (HCC): ICD-10-CM

## 2021-06-01 RX ORDER — GABAPENTIN 100 MG/1
CAPSULE ORAL
Qty: 60 CAPSULE | Refills: 0 | Status: SHIPPED | OUTPATIENT
Start: 2021-06-05 | End: 2021-07-05

## 2021-06-09 ENCOUNTER — TELEPHONE (OUTPATIENT)
Dept: ORTHOPEDIC SURGERY | Age: 61
End: 2021-06-09

## 2021-07-13 ENCOUNTER — OFFICE VISIT (OUTPATIENT)
Dept: ORTHOPEDIC SURGERY | Age: 61
End: 2021-07-13
Payer: MEDICAID

## 2021-07-13 VITALS — HEIGHT: 62 IN | WEIGHT: 208 LBS | BODY MASS INDEX: 38.28 KG/M2

## 2021-07-13 DIAGNOSIS — S52.502A CLOSED FRACTURE OF DISTAL END OF LEFT RADIUS, UNSPECIFIED FRACTURE MORPHOLOGY, INITIAL ENCOUNTER: Primary | ICD-10-CM

## 2021-07-13 DIAGNOSIS — S72.142A CLOSED DISPLACED INTERTROCHANTERIC FRACTURE OF LEFT FEMUR, INITIAL ENCOUNTER (HCC): ICD-10-CM

## 2021-07-13 PROCEDURE — G8417 CALC BMI ABV UP PARAM F/U: HCPCS | Performed by: ORTHOPAEDIC SURGERY

## 2021-07-13 PROCEDURE — 3017F COLORECTAL CA SCREEN DOC REV: CPT | Performed by: ORTHOPAEDIC SURGERY

## 2021-07-13 PROCEDURE — 1036F TOBACCO NON-USER: CPT | Performed by: ORTHOPAEDIC SURGERY

## 2021-07-13 PROCEDURE — G8427 DOCREV CUR MEDS BY ELIG CLIN: HCPCS | Performed by: ORTHOPAEDIC SURGERY

## 2021-07-13 PROCEDURE — 99214 OFFICE O/P EST MOD 30 MIN: CPT | Performed by: ORTHOPAEDIC SURGERY

## 2021-07-13 NOTE — PROGRESS NOTES
DIAGNOSIS:    1-Left distal radius 3 parts intra articular comminuted fracture, status post ORIF. 2-Left femur intertrochanteric displaced periprosthetic fracture with short Gamma nail. 3-Left supracondylar femur comminuted fracture ORIF with a locking plate, out side facility. DATE OF SURGERY: 3/22/2021. HISTORY OF PRESENT ILLNESS:  Ms. Audrey Vera 57 y.o.  female right handed returns today  for 3 months postoperative visit. The patient denies any significant pain in the left wrist.  Rates pain a 0/10 VAS mild, aching, intermittent and are improving. Aggravating factors movement. Alleviating factors elevation and rest. No numbness or tingling sensation. No fever or Chills. The patient denies any significant pain in the left knee or hip. She rates her pain a 3/10 VAS when using her walker. Pain is worse with walking and better with rest.  She has been working on ROM and WB and done with HHPT/OT. No numbness or tingling sensation. No fever or Chills.        Past Medical History:   Diagnosis Date    Hypertension     Type II or unspecified type diabetes mellitus without mention of complication, not stated as uncontrolled        Past Surgical History:   Procedure Laterality Date    FEMUR FRACTURE SURGERY Left 3/22/2021    LEFT GAMMA NAIL INSERTION LAURA performed by Miguelina Miller MD at [facility] Left 3/22/2021    LEFT WRIST OPEN REDUCTION INTERNAL FIXATION performed by Miguelina Miller MD at [place] [address] History     Socioeconomic History    Marital status:      Spouse name: Not on file    Number of children: Not on file    Years of education: Not on file    Highest education level: Not on file   Occupational History    Not on file   Tobacco Use    Smoking status: Never Smoker    Smokeless tobacco: Never Used   Vaping Use    Vaping Use: Never used   Substance and Sexual Activity    Alcohol use: No    Drug use: Not on file    Sexual activity: Not on file   Other Topics Concern    Not on file   Social History Narrative    Not on file     Social Determinants of Health     Financial Resource Strain:     Difficulty of Paying Living Expenses:    Food Insecurity:     Worried About Running Out of Food in the Last Year:     [address] in the Last Year:    Transportation Needs:     Lack of Transportation (Medical):  Lack of Transportation (Non-Medical):    Physical Activity:     Days of Exercise per Week:     Minutes of Exercise per Session:    Stress:     Feeling of Stress :    Social Connections:     Frequency of Communication with Friends and Family:     Frequency of Social Gatherings with Friends and Family:     Attends Orthodoxy Services:     Active Member of Clubs or Organizations:     Attends Club or Organization Meetings:     Marital Status:    Intimate Partner Violence:     Fear of Current or Ex-Partner:     Emotionally Abused:     Physically Abused:     Sexually Abused:        No family history on file.     Current Outpatient Medications on File Prior to Visit   Medication Sig Dispense Refill    gabapentin (NEURONTIN) 100 MG capsule TAKE ONE CAPSULE BY MOUTH TWICE A DAY 60 capsule 0    famotidine (PEPCID) 20 MG tablet TAKE ONE TABLET BY MOUTH TWICE A DAY 60 tablet 2    sodium chloride 1 g tablet Take 1 tablet by mouth 2 times daily (with meals) 60 tablet 0    atenolol (TENORMIN) 25 MG tablet TAKE ONE TABLET BY MOUTH DAILY 30 tablet 5    amLODIPine (NORVASC) 5 MG tablet Take 1 tablet by mouth daily 30 tablet 5    glyBURIDE (DIABETA) 5 MG tablet TAKE ONE TABLET BY MOUTH DAILY 30 tablet 5    metFORMIN (GLUCOPHAGE) 1000 MG tablet TAKE ONE TABLET BY MOUTH TWICE A DAY 60 tablet 5    escitalopram (LEXAPRO) 10 MG tablet Take 1 tablet by mouth daily 30 tablet 5    oxybutynin (DITROPAN) 5 MG tablet Take 1 tablet by mouth 3 times daily 90 tablet 5    enoxaparin (LOVENOX) 40 MG/0.4ML injection Inject 0.4 mLs into the skin daily 14 Syringe 0    blood glucose test strips (ASCENSIA AUTODISC VI;ONE TOUCH ULTRA TEST VI) strip USE TO TEST DAILY. DX;E11.9 100 each 3    Blood Glucose Monitoring Suppl ([employer] BLOOD GLUCOSE KIT) KIT USE TO TEST DAILY. DX:E11.9 1 kit 0     No current facility-administered medications on file prior to visit. Pertinent items are noted in HPI  Review of systems reviewed from Patient History Form dated on 4/6/2021 and available in the patient's chart under the Media tab. No change noted. PHYSICAL EXAMINATION:  Ms. Sofia Fernando is a very pleasant 57 y.o.  female who presents today in no acute distress, awake, alert, and oriented. She is well dressed, nourished and  groomed. Patient with normal affect. Height is  5' 2\" (1.575 m), weight is 208 lb (94.3 kg), Body mass index is 38.04 kg/m². Resting respiratory rate is 16. The patient walks with no limp using a walker. The incision is completely healed left hip and wrist.  No signs of any erythema or drainage. She has no pain with the active or passive range of motion of the left wrist, full ROM. She  has intact sensation, distally, and she is neurovascularly intact. She has no pain with the active or passive range of motion of the left knee and hip. She has intact sensation, distally, and  is neurovascularly intact. Bilateral calves soft and nontender. She has bilateral lower extremity edema. IMAGING:  Three views left wrist taken today in the office showed anatomic alignment of left distal radius, plate and screws in good position, no loosening. Xray taken today in the office 3 views left knee and left hip, showed anatomic alignment of the supracondylar femur periprosthetic fracture, locking plate in good position, no loosening, or hardware failure. IMPRESSION:     1-Left distal radius 3 parts intra articular comminuted fracture, status post ORIF.   2-Left femur intertrochanteric displaced periprosthetic fracture with short Gamma

## 2021-10-12 ENCOUNTER — OFFICE VISIT (OUTPATIENT)
Dept: ORTHOPEDIC SURGERY | Age: 61
End: 2021-10-12
Payer: MEDICAID

## 2021-10-12 VITALS — RESPIRATION RATE: 18 BRPM | HEIGHT: 62 IN | WEIGHT: 208 LBS | BODY MASS INDEX: 38.28 KG/M2

## 2021-10-12 DIAGNOSIS — S72.452A CLOSED SUPRACONDYLAR FRACTURE OF LEFT FEMUR, INITIAL ENCOUNTER (HCC): ICD-10-CM

## 2021-10-12 DIAGNOSIS — S52.502A CLOSED FRACTURE OF DISTAL END OF LEFT RADIUS, UNSPECIFIED FRACTURE MORPHOLOGY, INITIAL ENCOUNTER: Primary | ICD-10-CM

## 2021-10-12 DIAGNOSIS — S72.142A CLOSED DISPLACED INTERTROCHANTERIC FRACTURE OF LEFT FEMUR, INITIAL ENCOUNTER (HCC): ICD-10-CM

## 2021-10-12 PROCEDURE — 3017F COLORECTAL CA SCREEN DOC REV: CPT | Performed by: ORTHOPAEDIC SURGERY

## 2021-10-12 PROCEDURE — 1036F TOBACCO NON-USER: CPT | Performed by: ORTHOPAEDIC SURGERY

## 2021-10-12 PROCEDURE — 99213 OFFICE O/P EST LOW 20 MIN: CPT | Performed by: ORTHOPAEDIC SURGERY

## 2021-10-12 PROCEDURE — G8427 DOCREV CUR MEDS BY ELIG CLIN: HCPCS | Performed by: ORTHOPAEDIC SURGERY

## 2021-10-12 PROCEDURE — G8484 FLU IMMUNIZE NO ADMIN: HCPCS | Performed by: ORTHOPAEDIC SURGERY

## 2021-10-12 PROCEDURE — G8417 CALC BMI ABV UP PARAM F/U: HCPCS | Performed by: ORTHOPAEDIC SURGERY

## 2021-10-13 NOTE — PROGRESS NOTES
DIAGNOSIS:    1-Left distal radius 3 parts intra articular comminuted fracture, status post ORIF. 2-Left femur intertrochanteric displaced periprosthetic fracture with short Gamma nail. 3-Left supracondylar femur comminuted fracture ORIF with a locking plate, out side facility. DATE OF SURGERY: 3/22/2021. HISTORY OF PRESENT ILLNESS:  Ms. Brayden Cook 57 y.o.  female right handed returns today  for 6 months postoperative visit. The patient denies any significant pain in the left wrist.  Rates pain a 0/10 VAS mild, aching, intermittent and are improving. Aggravating factors movement. Alleviating factors elevation and rest. No numbness or tingling sensation. No fever or Chills. The patient denies any significant pain in the left knee or hip. She rates her pain a 0/10 VAS when using her walker. Pain is worse with walking and better with rest.  She has been working on ROM and WB and done with HHPT/OT. No numbness or tingling sensation. No fever or Chills.        Past Medical History:   Diagnosis Date    Hypertension     Type II or unspecified type diabetes mellitus without mention of complication, not stated as uncontrolled        Past Surgical History:   Procedure Laterality Date    FEMUR FRACTURE SURGERY Left 3/22/2021    LEFT GAMMA NAIL INSERTION LAURA performed by Hollis Hebert MD at [facility] Left 3/22/2021    LEFT WRIST OPEN REDUCTION INTERNAL FIXATION performed by Hollis Hebert MD at [place] [id number] History     Socioeconomic History    Marital status:      Spouse name: Not on file    Number of children: Not on file    Years of education: Not on file    Highest education level: Not on file   Occupational History    Not on file   Tobacco Use    Smoking status: Never Smoker    Smokeless tobacco: Never Used   Vaping Use    Vaping Use: Never used   Substance and Sexual Activity    Alcohol use: No    Drug use: Not on file    Sexual activity: Not on file   Other Topics Concern    Not on file   Social History Narrative    Not on file     Social Determinants of Health     Financial Resource Strain:     Difficulty of Paying Living Expenses:    Food Insecurity:     Worried About Running Out of Food in the Last Year:     [address] in the Last Year:    Transportation Needs:     Lack of Transportation (Medical):  Lack of Transportation (Non-Medical):    Physical Activity:     Days of Exercise per Week:     Minutes of Exercise per Session:    Stress:     Feeling of Stress :    Social Connections:     Frequency of Communication with Friends and Family:     Frequency of Social Gatherings with Friends and Family:     Attends Spiritism Services:     Active Member of Clubs or Organizations:     Attends Club or Organization Meetings:     Marital Status:    Intimate Partner Violence:     Fear of Current or Ex-Partner:     Emotionally Abused:     Physically Abused:     Sexually Abused:        History reviewed. No pertinent family history.     Current Outpatient Medications on File Prior to Visit   Medication Sig Dispense Refill    gabapentin (NEURONTIN) 100 MG capsule TAKE ONE CAPSULE BY MOUTH TWICE A DAY 60 capsule 0    famotidine (PEPCID) 20 MG tablet TAKE ONE TABLET BY MOUTH TWICE A DAY 60 tablet 2    sodium chloride 1 g tablet Take 1 tablet by mouth 2 times daily (with meals) 60 tablet 0    atenolol (TENORMIN) 25 MG tablet TAKE ONE TABLET BY MOUTH DAILY 30 tablet 5    amLODIPine (NORVASC) 5 MG tablet Take 1 tablet by mouth daily 30 tablet 5    glyBURIDE (DIABETA) 5 MG tablet TAKE ONE TABLET BY MOUTH DAILY 30 tablet 5    metFORMIN (GLUCOPHAGE) 1000 MG tablet TAKE ONE TABLET BY MOUTH TWICE A DAY 60 tablet 5    escitalopram (LEXAPRO) 10 MG tablet Take 1 tablet by mouth daily 30 tablet 5    oxybutynin (DITROPAN) 5 MG tablet Take 1 tablet by mouth 3 times daily 90 tablet 5    enoxaparin (LOVENOX) 40 MG/0.4ML injection Inject 0.4 mLs into the skin daily 14 Syringe 0    blood glucose test strips (ASCENSIA AUTODISC VI;ONE TOUCH ULTRA TEST VI) strip USE TO TEST DAILY. DX;E11.9 100 each 3    Blood Glucose Monitoring Suppl ([employer] BLOOD GLUCOSE KIT) KIT USE TO TEST DAILY. DX:E11.9 1 kit 0     No current facility-administered medications on file prior to visit. Pertinent items are noted in HPI  Review of systems reviewed from Patient History Form dated on 4/6/2021 and available in the patient's chart under the Media tab. No change noted. PHYSICAL EXAMINATION:  Ms. Mikel Velasquez is a very pleasant 57 y.o.  female who presents today in no acute distress, awake, alert, and oriented. She is well dressed, nourished and  groomed. Patient with normal affect. Height is  5' 2\" (1.575 m), weight is 208 lb (94.3 kg), Body mass index is 38.04 kg/m². Resting respiratory rate is 16. The patient walks with no limp using a walker. The incision is completely healed left hip and wrist.  No signs of any erythema or drainage. She has no pain with the active or passive range of motion of the left wrist, full ROM. She  has intact sensation, distally, and she is neurovascularly intact. She has no pain with the active or passive range of motion of the left knee and hip. She has intact sensation, distally, and  is neurovascularly intact. Bilateral calves soft and nontender. She has bilateral lower extremity edema. IMAGING:  Three views left wrist taken today in the office showed anatomic alignment of left distal radius, plate and screws in good position, no loosening. Xray taken today in the office 3 views left knee and left hip, showed anatomic alignment of the supracondylar femur periprosthetic fracture, locking plate in good position, no loosening, or hardware failure. IMPRESSION:     1-Left distal radius 3 parts intra articular comminuted fracture, status post ORIF.   2-Left femur intertrochanteric displaced periprosthetic fracture with short Gamma nail. 3-Left supracondylar femur comminuted fracture ORIF with a locking plate, out side facility. PLAN: For the wrist: I have told the patient to work on ROM, as well as strengthening exercises. The patient will come back for a follow up in 3 months. At that time, we will take 3 views of the left wrist. PT if needed then. For the hip: I have told the patient to work on ROM, WBAT as well as strengthening exercises. I discussed with the patient that I think that she would really benefit from a course of physical therapy for further strengthening and stretching. She would like to do it on her own. The patient will come back for a follow up in 3 months. At that time, we will take xray 4 views left hip. As this patient has demonstrated risk factors for osteoporosis, such as age greater than [de-identified] years and evidence of a fracture, I have referred the patient back to the primary care physician for evaluation for osteoporosis, including consideration for DEXA scanning, if this is felt to be clinically indicated. The patient is advised to contact the primary care physician to follow-up for further evaluation.        Nga Smith MD

## 2021-12-15 ENCOUNTER — CLINICAL DOCUMENTATION (OUTPATIENT)
Dept: OTHER | Age: 61
End: 2021-12-15

## 2022-02-24 ENCOUNTER — TELEPHONE (OUTPATIENT)
Dept: ORTHOPEDIC SURGERY | Age: 62
End: 2022-02-24

## 2022-07-14 ENCOUNTER — HOSPITAL ENCOUNTER (OUTPATIENT)
Dept: GENERAL RADIOLOGY | Age: 62
Discharge: HOME | End: 2022-07-14
Payer: MEDICAID

## 2022-07-14 DIAGNOSIS — Z78.0 POSTMENOPAUSAL: ICD-10-CM

## 2022-07-14 PROCEDURE — 77080 DXA BONE DENSITY AXIAL: CPT

## 2022-08-10 RX ORDER — BLOOD-GLUCOSE METER
EACH MISCELLANEOUS
Qty: 100 STRIP | OUTPATIENT
Start: 2022-08-10

## 2022-12-07 NOTE — CARE COORDINATION
[facility]    Referral received from  to follow for home care services.    On license of [facility] unable to accept out of service area    [address] [place] [address], DAMIAN KLEIN  [facility] [phone] PMD

## (undated) DEVICE — REAMER SHAFT, MOD.TRINKLE: Brand: BIXCUT

## (undated) DEVICE — BANDAGE COBAN 6 IN WND 6INX5YD FOAM

## (undated) DEVICE — DRILL, AO, SCALED: Brand: VARIAX

## (undated) DEVICE — SYRINGE BLB 50CC IRRIG PLIABLE FNGR FLNG GRAD FLSK DISP

## (undated) DEVICE — CUSTOM PACK: Brand: UNBRANDED

## (undated) DEVICE — 3M™ STERI-STRIP™ COMPOUND BENZOIN TINCTURE 40 BAGS/CARTON 4 CARTONS/CASE C1544: Brand: 3M™ STERI-STRIP™

## (undated) DEVICE — GLOVE ORANGE PI 8   MSG9080

## (undated) DEVICE — DBD-PACK,EXTREMITY,UPPER,AURORA: Brand: MEDLINE

## (undated) DEVICE — SUTURE VCRL + SZ 2-0 L36IN ABSRB UD L36MM CT-1 1/2 CIR VCP945H

## (undated) DEVICE — KNIFE SURG 15 DEG STBL BLADE

## (undated) DEVICE — DRAPE C ARM W54XL84IN MINI FOR OEC 6800

## (undated) DEVICE — GOWN,SIRUS,NONRNF,SETINSLV,XL,20/CS: Brand: MEDLINE

## (undated) DEVICE — SLING ARM L L165IN D75IN WHT POLY MESH ENVELOP MTL SIDE

## (undated) DEVICE — SUTURE VCRL SZ 0 L36IN ABSRB UD CT-1 L36MM 1/2 CIR TAPR PNT VCP946H

## (undated) DEVICE — SUTURE VCRL + SZ 2-0 L18IN ABSRB UD CT1 L36MM 1/2 CIR VCP839D

## (undated) DEVICE — SUTURE VCRL + SZ 3-0 L18IN ABSRB UD SH 1/2 CIR TAPERCUT NDL VCP864D

## (undated) DEVICE — DRILL, AO SMALL: Brand: GAMMA

## (undated) DEVICE — 3M™ STERI-STRIP™ REINFORCED ADHESIVE SKIN CLOSURES, R1547, 1/2 IN X 4 IN (12 MM X 100 MM), 6 STRIPS/ENVELOPE: Brand: 3M™ STERI-STRIP™

## (undated) DEVICE — SMARTGOWN BREATHABLE SURGICAL GOWN: Brand: CONVERTORS

## (undated) DEVICE — Device

## (undated) DEVICE — 3M™ TEGADERM™ TRANSPARENT FILM DRESSING FRAME STYLE WITH BORDER, 1616, 4 IN X 4-3/4 IN (10 CM X 12 CM), 50/CT 4CT/CASE: Brand: 3M™ TEGADERM™

## (undated) DEVICE — COVER LT HNDL PLAS RIG 2 PER PK

## (undated) DEVICE — DRESSING,GAUZE,XEROFORM,CURAD,1"X8",ST: Brand: CURAD

## (undated) DEVICE — 6619 2 PTNT ISO SYS INCISE AREA&LT;(&GT;&&LT;)&GT;P: Brand: STERI-DRAPE™ IOBAN™ 2

## (undated) DEVICE — GUIDE WIRE, BALL-TIPPED, STERILE

## (undated) DEVICE — GAUZE,SPONGE,4"X4",8PLY,STRL,LF,10/TRAY: Brand: MEDLINE

## (undated) DEVICE — SUTURE MCRYL + SZ 4-0 L18IN ABSRB UD L19MM PS-2 3/8 CIR MCP496G

## (undated) DEVICE — SOLUTION IRRIG 1000ML 09% SOD CHL USP PIC PLAS CONTAINER

## (undated) DEVICE — STAPLER SKIN H3.9MM WIRE DIA0.58MM CRWN 6.9MM 35 STPL FIX

## (undated) DEVICE — CHLORAPREP 26ML ORANGE

## (undated) DEVICE — SPLINT ORTH W3XL12IN LAYERED FBRGLS FOAM PD BRTH BK MOLD

## (undated) DEVICE — PADDING CAST 6 IN SYNTH

## (undated) DEVICE — PACK PROCEDURE SURG HIP PIN TROCHANTERIC FEM NAIL CDS

## (undated) DEVICE — DRESSING ALG W4XL8IN AG FOAM SUPERABSORBENT SIL ANTIMIC

## (undated) DEVICE — DRAPE HND W114XL142IN BLU POLYPR W O PCH FEN CRD AND TB HLDR